# Patient Record
Sex: MALE | Race: BLACK OR AFRICAN AMERICAN | NOT HISPANIC OR LATINO | Employment: OTHER | URBAN - METROPOLITAN AREA
[De-identification: names, ages, dates, MRNs, and addresses within clinical notes are randomized per-mention and may not be internally consistent; named-entity substitution may affect disease eponyms.]

---

## 2020-08-18 ENCOUNTER — OFFICE VISIT (OUTPATIENT)
Dept: INTERNAL MEDICINE | Facility: CLINIC | Age: 85
End: 2020-08-18
Payer: MEDICARE

## 2020-08-18 VITALS
OXYGEN SATURATION: 98 % | WEIGHT: 129 LBS | HEIGHT: 60 IN | DIASTOLIC BLOOD PRESSURE: 68 MMHG | SYSTOLIC BLOOD PRESSURE: 132 MMHG | RESPIRATION RATE: 16 BRPM | TEMPERATURE: 99 F | HEART RATE: 70 BPM | BODY MASS INDEX: 25.32 KG/M2

## 2020-08-18 DIAGNOSIS — R41.3 MEMORY PROBLEM: ICD-10-CM

## 2020-08-18 DIAGNOSIS — D53.9 MACROCYTIC ANEMIA: ICD-10-CM

## 2020-08-18 DIAGNOSIS — Z13.6 ENCOUNTER FOR SCREENING FOR CARDIOVASCULAR DISORDERS: Primary | ICD-10-CM

## 2020-08-18 LAB
ABS NRBC COUNT: 0 X 10 3/UL (ref 0–0.01)
ABSOLUTE BASOPHIL: 0.04 X 10 3/UL (ref 0–0.22)
ABSOLUTE EOSINOPHIL: 0.03 X 10 3/UL (ref 0.04–0.54)
ABSOLUTE IMMATURE GRAN: 0.03 X 10 3/UL (ref 0–0.04)
ABSOLUTE LYMPHOCYTE: 2 X 10 3/UL (ref 0.86–4.75)
ABSOLUTE MONOCYTE: 0.7 X 10 3/UL (ref 0.22–1.08)
ALBUMIN SERPL-MCNC: 4.2 G/DL (ref 3.5–5.2)
ALBUMIN/GLOB SERPL ELPH: 1.3 {RATIO} (ref 1–2.7)
ALP ISOS SERPL LEV INH-CCNC: 56 U/L (ref 40–130)
ALT (SGPT): 19 U/L (ref 0–41)
ANION GAP SERPL CALC-SCNC: 9 MMOL/L (ref 8–17)
AST SERPL-CCNC: 26 U/L (ref 0–40)
BASOPHILS NFR BLD: 0.7 % (ref 0.2–1.2)
BILIRUBIN, TOTAL: 0.4 MG/DL (ref 0–1.2)
BUN/CREAT SERPL: 15.4 (ref 6–20)
CALCIUM SERPL-MCNC: 9.9 MG/DL (ref 8.6–10.2)
CARBON DIOXIDE, CO2: 30 MMOL/L (ref 22–29)
CHLORIDE: 102 MMOL/L (ref 98–107)
CHOLEST SERPL-MSCNC: 180 MG/DL (ref 100–200)
CREAT SERPL-MCNC: 0.69 MG/DL (ref 0.7–1.2)
EOSINOPHIL NFR BLD: 0.6 % (ref 0.7–7)
GFR ESTIMATION: 107.01
GLOBULIN: 3.2 G/DL (ref 1.5–4.5)
GLUCOSE: 101 MG/DL (ref 75–121)
HCT VFR BLD AUTO: 42.4 % (ref 42–52)
HDLC SERPL-MCNC: 49 MG/DL
HGB BLD-MCNC: 13.4 G/DL (ref 14–18)
IMMATURE GRANULOCYTES: 0.6 % (ref 0–0.5)
LDL/HDL RATIO: 2.3 (ref 1–3)
LDLC SERPL CALC-MCNC: 112.2 MG/DL (ref 0–100)
LYMPHOCYTES NFR BLD: 37.4 % (ref 19.3–53.1)
MCH RBC QN AUTO: 31.8 PG (ref 27–32)
MCHC RBC AUTO-ENTMCNC: 31.6 G/DL (ref 32–36)
MCV RBC AUTO: 100.5 FL (ref 80–94)
MONOCYTES NFR BLD: 13.1 % (ref 4.7–12.5)
NEUTROPHILS ABSOLUTE COUNT: 2.55 X 10 3/UL (ref 2.15–7.56)
NEUTROPHILS NFR BLD: 47.6 % (ref 34–71.1)
NUCLEATED RED BLOOD CELLS: 0 /100 WBC (ref 0–0.2)
PLATELET # BLD AUTO: 190 X 10 3/UL (ref 135–400)
POTASSIUM: 4.6 MMOL/L (ref 3.5–5.1)
PROT SNV-MCNC: 7.4 G/DL (ref 6.4–8.3)
RBC # BLD AUTO: 4.22 X 10 6/UL (ref 4.7–6.1)
RDW-SD: 51.3 FL (ref 37–54)
SODIUM: 141 MMOL/L (ref 136–145)
TRIGL SERPL-MCNC: 94 MG/DL (ref 0–150)
TSH SERPL DL<=0.005 MIU/L-ACNC: 3.03 UIU/ML (ref 0.27–4.2)
UREA NITROGEN (BUN): 10.6 MG/DL (ref 7–22)
WBC # BLD: 5.35 X 10 3/UL (ref 4.3–10.8)

## 2020-08-18 PROCEDURE — G0439 PR MEDICARE ANNUAL WELLNESS SUBSEQUENT VISIT: ICD-10-PCS | Mod: S$GLB,,, | Performed by: INTERNAL MEDICINE

## 2020-08-18 PROCEDURE — G0439 PPPS, SUBSEQ VISIT: HCPCS | Mod: S$GLB,,, | Performed by: INTERNAL MEDICINE

## 2020-08-18 PROCEDURE — 1159F PR MEDICATION LIST DOCUMENTED IN MEDICAL RECORD: ICD-10-PCS | Mod: S$GLB,,, | Performed by: INTERNAL MEDICINE

## 2020-08-18 PROCEDURE — 1159F MED LIST DOCD IN RCRD: CPT | Mod: S$GLB,,, | Performed by: INTERNAL MEDICINE

## 2020-08-18 RX ORDER — TIMOLOL MALEATE 5 MG/ML
1 SOLUTION/ DROPS OPHTHALMIC 2 TIMES DAILY
COMMUNITY
Start: 2020-06-30

## 2020-08-18 RX ORDER — LATANOPROST 50 UG/ML
1 SOLUTION/ DROPS OPHTHALMIC 2 TIMES DAILY
COMMUNITY
Start: 2020-06-30

## 2020-08-18 NOTE — PROGRESS NOTES
Subjective:      Patient ID: Ernie Mcmullen is a 89 y.o. male.    Chief Complaint: Medicare AWV    HPI:  Patient no significant past medical history presented today for and will wellness exam.  Patient reports memory is good and he is able to drive and do ADLs without any problem.    Review of Systems   Constitutional: Negative for chills, diaphoresis, fever, malaise/fatigue and weight loss.   HENT: Negative for congestion, ear pain, sinus pain, sore throat and tinnitus.    Eyes: Negative for blurred vision and photophobia.   Respiratory: Negative for cough, hemoptysis, shortness of breath and wheezing.    Cardiovascular: Negative for chest pain, palpitations, orthopnea, leg swelling and PND.   Gastrointestinal: Negative for abdominal pain, blood in stool, constipation, diarrhea, heartburn, melena, nausea and vomiting.   Genitourinary: Negative for dysuria, frequency and urgency.   Musculoskeletal: Negative for back pain, myalgias and neck pain.   Skin: Negative for rash.   Neurological: Negative for dizziness, tremors, seizures, loss of consciousness and weakness.   Endo/Heme/Allergies: Negative for polydipsia.   Psychiatric/Behavioral: Negative for depression and hallucinations. The patient does not have insomnia.      Objective:     Physical Exam  Constitutional:       General: He is not in acute distress.     Appearance: He is not diaphoretic.   Neck:      Thyroid: No thyromegaly.   Cardiovascular:      Rate and Rhythm: Normal rate and regular rhythm.      Heart sounds: Normal heart sounds. No murmur.   Pulmonary:      Effort: Pulmonary effort is normal. No respiratory distress.      Breath sounds: Normal breath sounds. No wheezing.   Abdominal:      General: Bowel sounds are normal. There is no distension.      Palpations: Abdomen is soft.      Tenderness: There is no abdominal tenderness.   Lymphadenopathy:      Cervical: No cervical adenopathy.   Neurological:      Mental Status: He is alert and oriented to  person, place, and time.   Psychiatric:         Behavior: Behavior normal.         Thought Content: Thought content normal.         Judgment: Judgment normal.       Assessment:       ICD-10-CM ICD-9-CM   1. Encounter for screening for cardiovascular disorders  Z13.6 V81.2   2. Memory problem  R41.3 780.93   3. Macrocytic anemia  D53.9 281.9       Plan:   Will order basic lab work including CBC, CMP, lipid  Son reported patient repeating same things again and again.  But patient insists that that is his habit agreed by his wife as well  Patient remembers all the information needed for ADLs and is managing his finances  Patient refused all vaccination  Patient did not remember any of the words that were  requested to remember  Patient labs suggested macrocytic anemia.  Will check B12 and folate  Patient is recommended to have Aricept declined by patient and the family has the think that his memory is at baseline  Talked to patient misti Klein and he will monitor the symptoms    Medication List with Changes/Refills   Current Medications    LATANOPROST 0.005 % OPHTHALMIC SOLUTION    1 drop 2 (two) times a day. One drop in each eye twice daily    TIMOLOL MALEATE 0.5% (TIMOPTIC) 0.5 % DROP    1 drop 2 (two) times a day. One drop in each eye twice daily

## 2020-08-20 DIAGNOSIS — E53.8 FOLATE DEFICIENCY: Primary | ICD-10-CM

## 2020-08-20 LAB
B12: 963 PG/ML (ref 232–1245)
FOLATE SERPL-MCNC: 5.29 NG/ML (ref 5.6–45.8)

## 2020-08-20 RX ORDER — FOLIC ACID 1 MG/1
1 TABLET ORAL DAILY
Qty: 90 TABLET | Refills: 3 | Status: SHIPPED | OUTPATIENT
Start: 2020-08-20 | End: 2023-10-16

## 2020-12-02 ENCOUNTER — TELEPHONE (OUTPATIENT)
Dept: PRIMARY CARE CLINIC | Facility: CLINIC | Age: 85
End: 2020-12-02

## 2020-12-02 DIAGNOSIS — B35.1 ONYCHOMYCOSIS: Primary | ICD-10-CM

## 2020-12-02 NOTE — TELEPHONE ENCOUNTER
----- Message from Nadine Maya sent at 12/1/2020  2:58 PM CST -----  Type:  Patient Returning Call    Who Calledpt   Who Left Message for Patient:Maida  Does the patient know what this is regarding?:referral   Would the patient rather a call back or a response via [facility]? Callback   Best Call Back Number:[phone]   Additional Information:

## 2020-12-02 NOTE — TELEPHONE ENCOUNTER
Staff called Dr Tahira Horvath (podiatist office) spoke to Susie. she advised pt was seen 10-9-2020 and need a referral for that date.

## 2020-12-02 NOTE — TELEPHONE ENCOUNTER
----- Message from Celina Long sent at 12/1/2020 12:09 PM CST -----  Regarding: patient info needed  Contact: Dr. Horvath's Office  Provider's office is needing a referral faxed over to them [phone] and call back at [phone]

## 2021-11-10 ENCOUNTER — OFFICE VISIT (OUTPATIENT)
Dept: PRIMARY CARE CLINIC | Facility: CLINIC | Age: 86
End: 2021-11-10
Payer: MEDICARE

## 2021-11-10 VITALS
HEIGHT: 60 IN | OXYGEN SATURATION: 98 % | DIASTOLIC BLOOD PRESSURE: 71 MMHG | HEART RATE: 67 BPM | WEIGHT: 136 LBS | TEMPERATURE: 97 F | BODY MASS INDEX: 26.7 KG/M2 | RESPIRATION RATE: 16 BRPM | SYSTOLIC BLOOD PRESSURE: 130 MMHG

## 2021-11-10 DIAGNOSIS — D53.9 MACROCYTIC ANEMIA: ICD-10-CM

## 2021-11-10 DIAGNOSIS — Z13.6 SCREENING FOR ISCHEMIC HEART DISEASE: Primary | ICD-10-CM

## 2021-11-10 PROCEDURE — 1159F MED LIST DOCD IN RCRD: CPT | Mod: CPTII,S$GLB,, | Performed by: INTERNAL MEDICINE

## 2021-11-10 PROCEDURE — 99213 PR OFFICE/OUTPT VISIT, EST, LEVL III, 20-29 MIN: ICD-10-PCS | Mod: S$GLB,,, | Performed by: INTERNAL MEDICINE

## 2021-11-10 PROCEDURE — 1159F PR MEDICATION LIST DOCUMENTED IN MEDICAL RECORD: ICD-10-PCS | Mod: CPTII,S$GLB,, | Performed by: INTERNAL MEDICINE

## 2021-11-10 PROCEDURE — 99213 OFFICE O/P EST LOW 20 MIN: CPT | Mod: S$GLB,,, | Performed by: INTERNAL MEDICINE

## 2021-11-10 PROCEDURE — 1160F PR REVIEW ALL MEDS BY PRESCRIBER/CLIN PHARMACIST DOCUMENTED: ICD-10-PCS | Mod: CPTII,S$GLB,, | Performed by: INTERNAL MEDICINE

## 2021-11-10 PROCEDURE — 1160F RVW MEDS BY RX/DR IN RCRD: CPT | Mod: CPTII,S$GLB,, | Performed by: INTERNAL MEDICINE

## 2021-11-10 RX ORDER — ASPIRIN 81 MG/1
81 TABLET ORAL
COMMUNITY
End: 2022-11-10

## 2021-11-11 LAB
ALBUMIN SERPL-MCNC: 4 G/DL (ref 3.6–5.1)
ALBUMIN/GLOB SERPL: 1.3 (CALC) (ref 1–2.5)
ALP SERPL-CCNC: 46 U/L (ref 35–144)
ALT SERPL-CCNC: 15 U/L (ref 9–46)
AST SERPL-CCNC: 20 U/L (ref 10–35)
BASOPHILS # BLD AUTO: 39 CELLS/UL (ref 0–200)
BASOPHILS NFR BLD AUTO: 0.8 %
BILIRUB SERPL-MCNC: 0.6 MG/DL (ref 0.2–1.2)
BUN SERPL-MCNC: 9 MG/DL (ref 7–25)
BUN/CREAT SERPL: ABNORMAL (CALC) (ref 6–22)
CALCIUM SERPL-MCNC: 9.5 MG/DL (ref 8.6–10.3)
CHLORIDE SERPL-SCNC: 103 MMOL/L (ref 98–110)
CHOLEST SERPL-MCNC: 174 MG/DL
CHOLEST/HDLC SERPL: 4 (CALC)
CO2 SERPL-SCNC: 30 MMOL/L (ref 20–32)
CREAT SERPL-MCNC: 0.74 MG/DL (ref 0.7–1.11)
EOSINOPHIL # BLD AUTO: 29 CELLS/UL (ref 15–500)
EOSINOPHIL NFR BLD AUTO: 0.6 %
ERYTHROCYTE [DISTWIDTH] IN BLOOD BY AUTOMATED COUNT: 12.5 % (ref 11–15)
GLOBULIN SER CALC-MCNC: 3.2 G/DL (CALC) (ref 1.9–3.7)
GLUCOSE SERPL-MCNC: 102 MG/DL (ref 65–99)
HCT VFR BLD AUTO: 39.1 % (ref 38.5–50)
HDLC SERPL-MCNC: 44 MG/DL
HGB BLD-MCNC: 13.1 G/DL (ref 13.2–17.1)
LDLC SERPL CALC-MCNC: 113 MG/DL (CALC)
LYMPHOCYTES # BLD AUTO: 1632 CELLS/UL (ref 850–3900)
LYMPHOCYTES NFR BLD AUTO: 33.3 %
MCH RBC QN AUTO: 32.5 PG (ref 27–33)
MCHC RBC AUTO-ENTMCNC: 33.5 G/DL (ref 32–36)
MCV RBC AUTO: 97 FL (ref 80–100)
MONOCYTES # BLD AUTO: 554 CELLS/UL (ref 200–950)
MONOCYTES NFR BLD AUTO: 11.3 %
NEUTROPHILS # BLD AUTO: 2646 CELLS/UL (ref 1500–7800)
NEUTROPHILS NFR BLD AUTO: 54 %
NONHDLC SERPL-MCNC: 130 MG/DL (CALC)
PLATELET # BLD AUTO: 189 THOUSAND/UL (ref 140–400)
PMV BLD REES-ECKER: 10.3 FL (ref 7.5–12.5)
POTASSIUM SERPL-SCNC: 4 MMOL/L (ref 3.5–5.3)
PROT SERPL-MCNC: 7.2 G/DL (ref 6.1–8.1)
RBC # BLD AUTO: 4.03 MILLION/UL (ref 4.2–5.8)
SODIUM SERPL-SCNC: 140 MMOL/L (ref 135–146)
TRIGL SERPL-MCNC: 80 MG/DL
TSH SERPL-ACNC: 1.98 MIU/L (ref 0.4–4.5)
WBC # BLD AUTO: 4.9 THOUSAND/UL (ref 3.8–10.8)

## 2021-11-12 ENCOUNTER — IMMUNIZATION (OUTPATIENT)
Dept: HEMATOLOGY/ONCOLOGY | Facility: CLINIC | Age: 86
End: 2021-11-12
Payer: MEDICARE

## 2021-11-12 DIAGNOSIS — Z23 NEED FOR VACCINATION: Primary | ICD-10-CM

## 2021-11-12 PROCEDURE — 0004A COVID-19, MRNA, LNP-S, PF, 30 MCG/0.3 ML DOSE VACCINE: ICD-10-PCS | Mod: CV19,S$GLB,, | Performed by: FAMILY MEDICINE

## 2021-11-12 PROCEDURE — 91300 COVID-19, MRNA, LNP-S, PF, 30 MCG/0.3 ML DOSE VACCINE: CPT | Mod: S$GLB,,, | Performed by: FAMILY MEDICINE

## 2021-11-12 PROCEDURE — 0004A COVID-19, MRNA, LNP-S, PF, 30 MCG/0.3 ML DOSE VACCINE: CPT | Mod: CV19,S$GLB,, | Performed by: FAMILY MEDICINE

## 2021-11-12 PROCEDURE — 91300 COVID-19, MRNA, LNP-S, PF, 30 MCG/0.3 ML DOSE VACCINE: ICD-10-PCS | Mod: S$GLB,,, | Performed by: FAMILY MEDICINE

## 2022-01-07 ENCOUNTER — TELEPHONE (OUTPATIENT)
Dept: PRIMARY CARE CLINIC | Facility: CLINIC | Age: 87
End: 2022-01-07
Payer: MEDICARE

## 2022-01-07 NOTE — TELEPHONE ENCOUNTER
S/w Patient's son Ernie and notified that he will need to contact the insurance for an approved podiatrist in network.

## 2022-01-07 NOTE — TELEPHONE ENCOUNTER
----- Message from Elvia Solomon sent at 1/5/2022 10:53 AM CST -----  Regarding: podiatry  Contact: Ernie (son)  Patient is on [employer] Medicare and no one in [place] will accept that insurance for podiatry.  Please let patient's son know that they will have to call around and see who will accept it.  It may say on their website that they are accepting it, but that's out of date, so he will need to call the offices and speak to someone before we send a referral.    ----- Message -----  From: Kelly Leary  Sent: 1/5/2022   9:31 AM CST  To: [facility] Staff    Type:  Patient Requesting Referral    Who Called: Ernie Mcmullen (son) re: his father Ernie Mcmullen   Does the patient already have the specialty appointment scheduled?: NO  If yes, what is the date of that appointment?:NO  Referral to What Specialty: PODIATRY  Reason for Referral: TO KEEP HIS TOENAILS CLIPPED  Does the patient want the referral with a specific physician?: N/A  Is the [facility]?: N/A  Patient Requesting a Response?: YES  Would the patient rather a call back or a [facility]?  CALL BACK   Best Call Back Number: [phone]   Additional Information: N/A

## 2022-02-09 ENCOUNTER — TELEPHONE (OUTPATIENT)
Dept: PRIMARY CARE CLINIC | Facility: CLINIC | Age: 87
End: 2022-02-09
Payer: MEDICARE

## 2022-02-09 NOTE — TELEPHONE ENCOUNTER
----- Message from Bonnie Montiel sent at 2/9/2022 11:02 AM CST -----  Contact: Pt son  .Type:  Patient Requesting Referral    Who Called: Ernie   Does the patient already have the specialty appointment scheduled?:   If yes, what is the date of that appointment?:   Referral to What Specialty: podiatrist   Reason for Referral: toe nail cut    Does the patient want the referral with a specific physician?:   Is the specialist an [facility]?:   Patient Requesting a Response?: yes  Would the patient rather a call back or a response via [facility]? Callback   Best Call Back Number:.[phone]    Additional Information:

## 2022-02-09 NOTE — TELEPHONE ENCOUNTER
S/w patient's son- Ernie and he was confused, stating he never heard of this. Advised to please contact his previous podiatrist and see what he was originally referred for or request the records so we can review them. He will call them and let us know.

## 2022-02-11 ENCOUNTER — TELEPHONE (OUTPATIENT)
Dept: PRIMARY CARE CLINIC | Facility: CLINIC | Age: 87
End: 2022-02-11
Payer: MEDICARE

## 2022-02-11 DIAGNOSIS — B35.1 ONYCHOMYCOSIS: Primary | ICD-10-CM

## 2022-02-11 NOTE — TELEPHONE ENCOUNTER
----- Message from Inés Belcher LPN sent at 2/10/2022  4:54 PM CST -----  Contact: Ernie (Son)    ----- Message -----  From: Kelly Leary  Sent: 2/10/2022  10:49 AM CST  To: [facility] Staff    Type:  Patient Returning Call    Who Called: Ernie (Son)   Who Left Message for Patient: Jazmyn  Does the patient know what this is regarding?: Code for podiatrist.  Would the patient rather a call back or a response via [facility]?   Call back   Best Call Back Number: [phone]   Additional Information: n/a

## 2022-10-13 ENCOUNTER — CLINICAL SUPPORT (OUTPATIENT)
Dept: PRIMARY CARE CLINIC | Facility: CLINIC | Age: 87
End: 2022-10-13
Payer: MEDICARE

## 2022-10-13 PROCEDURE — 90694 VACC AIIV4 NO PRSRV 0.5ML IM: CPT | Mod: S$GLB,,, | Performed by: INTERNAL MEDICINE

## 2022-10-13 PROCEDURE — G0008 FLU VACCINE - QUADRIVALENT - ADJUVANTED: ICD-10-PCS | Mod: S$GLB,,, | Performed by: INTERNAL MEDICINE

## 2022-10-13 PROCEDURE — 90694 FLU VACCINE - QUADRIVALENT - ADJUVANTED: ICD-10-PCS | Mod: S$GLB,,, | Performed by: INTERNAL MEDICINE

## 2022-10-13 PROCEDURE — G0008 ADMIN INFLUENZA VIRUS VAC: HCPCS | Mod: S$GLB,,, | Performed by: INTERNAL MEDICINE

## 2022-11-10 ENCOUNTER — OFFICE VISIT (OUTPATIENT)
Dept: PRIMARY CARE CLINIC | Facility: CLINIC | Age: 87
End: 2022-11-10
Payer: MEDICARE

## 2022-11-10 VITALS
HEART RATE: 62 BPM | WEIGHT: 124.63 LBS | SYSTOLIC BLOOD PRESSURE: 124 MMHG | TEMPERATURE: 98 F | HEIGHT: 60 IN | BODY MASS INDEX: 24.47 KG/M2 | DIASTOLIC BLOOD PRESSURE: 69 MMHG | RESPIRATION RATE: 16 BRPM | OXYGEN SATURATION: 99 %

## 2022-11-10 DIAGNOSIS — Z00.00 ENCOUNTER FOR MEDICARE ANNUAL WELLNESS EXAM: ICD-10-CM

## 2022-11-10 DIAGNOSIS — H91.93 DECREASED HEARING OF BOTH EARS: ICD-10-CM

## 2022-11-10 DIAGNOSIS — Z13.6 SCREENING FOR ISCHEMIC HEART DISEASE: Primary | ICD-10-CM

## 2022-11-10 DIAGNOSIS — D64.9 ANEMIA, UNSPECIFIED TYPE: ICD-10-CM

## 2022-11-10 DIAGNOSIS — E53.8 FOLATE DEFICIENCY: ICD-10-CM

## 2022-11-10 PROCEDURE — G0439 PPPS, SUBSEQ VISIT: HCPCS | Mod: S$GLB,,, | Performed by: INTERNAL MEDICINE

## 2022-11-10 PROCEDURE — 1160F PR REVIEW ALL MEDS BY PRESCRIBER/CLIN PHARMACIST DOCUMENTED: ICD-10-PCS | Mod: CPTII,S$GLB,, | Performed by: INTERNAL MEDICINE

## 2022-11-10 PROCEDURE — 1159F PR MEDICATION LIST DOCUMENTED IN MEDICAL RECORD: ICD-10-PCS | Mod: CPTII,S$GLB,, | Performed by: INTERNAL MEDICINE

## 2022-11-10 PROCEDURE — 1160F RVW MEDS BY RX/DR IN RCRD: CPT | Mod: CPTII,S$GLB,, | Performed by: INTERNAL MEDICINE

## 2022-11-10 PROCEDURE — G0439 PR MEDICARE ANNUAL WELLNESS SUBSEQUENT VISIT: ICD-10-PCS | Mod: S$GLB,,, | Performed by: INTERNAL MEDICINE

## 2022-11-10 PROCEDURE — 1159F MED LIST DOCD IN RCRD: CPT | Mod: CPTII,S$GLB,, | Performed by: INTERNAL MEDICINE

## 2022-11-10 NOTE — PROGRESS NOTES
Subjective:      Patient ID: Ernie Mcmullen is a 89 y.o. male.    Chief Complaint: Medicare AWV    HPI:  Patient no significant past medical history presented today for and will wellness exam.  Patient reports memory is good and he is able to drive and do ADLs without any problem.  Patient is not taking any medications     Review of Systems   Constitutional: Negative for chills, diaphoresis, fever, malaise/fatigue and weight loss.   HENT: Negative for congestion, ear pain, sinus pain, sore throat and tinnitus.    Eyes: Negative for blurred vision and photophobia.   Respiratory: Negative for cough, hemoptysis, shortness of breath and wheezing.    Cardiovascular: Negative for chest pain, palpitations, orthopnea, leg swelling and PND.   Gastrointestinal: Negative for abdominal pain, blood in stool, constipation, diarrhea, heartburn, melena, nausea and vomiting.   Genitourinary: Negative for dysuria, frequency and urgency.   Musculoskeletal: Negative for back pain, myalgias and neck pain.   Skin: Negative for rash.   Neurological: Negative for dizziness, tremors, seizures, loss of consciousness and weakness.   Endo/Heme/Allergies: Negative for polydipsia.   Psychiatric/Behavioral: Negative for depression and hallucinations. The patient does not have insomnia.      Objective:     Physical Exam  Constitutional:       General: He is not in acute distress.     Appearance: He is not diaphoretic.   Neck:      Thyroid: No thyromegaly.   Cardiovascular:      Rate and Rhythm: Normal rate and regular rhythm.      Heart sounds: Normal heart sounds. No murmur.   Pulmonary:      Effort: Pulmonary effort is normal. No respiratory distress.      Breath sounds: Normal breath sounds. No wheezing.   Abdominal:      General: Bowel sounds are normal. There is no distension.      Palpations: Abdomen is soft.      Tenderness: There is no abdominal tenderness.   Lymphadenopathy:      Cervical: No cervical adenopathy.   Neurological:       Mental Status: He is alert and oriented to person, place, and time.   Psychiatric:         Behavior: Behavior normal.         Thought Content: Thought content normal.         Judgment: Judgment normal.       Assessment:       ICD-10-CM ICD-9-CM   1. Screening for ischemic heart disease  Z13.6 V81.0   2. Encounter for Medicare annual wellness exam  Z00.00 V70.0   3. Anemia, unspecified type  D64.9 285.9   4. Decreased hearing of both ears  H91.93 389.9   5. Folate deficiency  E53.8 266.2         Plan:     Pt is here for wellness exam.  Will order Annual labs  Patient has previous history of folate deficiency.  Will check folate and B12 as well  Patient last labs suggested anemia.  Will repeat CBC and thyroid function  Patient reports decreased hearing Will refer to audiologist for further evaluation  Patient does not have any h/o coronary artery disease or CVA.  Will stop aspirin      Medication List with Changes/Refills   Current Medications    FOLIC ACID (FOLVITE) 1 MG TABLET    Take 1 tablet (1 mg total) by mouth once daily.    LATANOPROST 0.005 % OPHTHALMIC SOLUTION    1 drop 2 (two) times a day. One drop in each eye twice daily    TIMOLOL MALEATE 0.5% (TIMOPTIC) 0.5 % DROP    1 drop 2 (two) times a day. One drop in each eye twice daily   Discontinued Medications    ASPIRIN (ECOTRIN) 81 MG EC TABLET    Take 81 mg by mouth.          Ernie Mcmullen presented for a  Medicare AWV and comprehensive Health Risk Assessment today. The following components were reviewed and updated:      Health Risk Assessment  During the past four weeks, was someone available to help if you needed and wanted help?: Yes, as much as I wanted  Can you go shopping for groceries or clothes without someone's help?: Yes  Can you prepare your own meals?: Yes  Can you do your own housework without help?: Yes  Are you a smoker?: No   Patient COVID vaccine is up-to-date,  Patient has received influenza vaccine.  Recommended patient to get  pneumococcal 20, Tdap, shingles vaccine..  Declined by daughter at this time    Health Maintenance Topics with due status: Not Due       Topic Last Completion Date    Lipid Panel 11/10/2021      Patient Care Team:  Fide Zimmer MD as PCP - General (Internal Medicine)          ** See Completed Assessments for Annual Wellness Visit within the encounter summary.**       The following assessments were completed:    Depression Screening  Depression Patient Health Questionnaire (PHQ-2)  Over the last two weeks how often have you been bothered by little interest or pleasure in doing things: Not at all  Over the last two weeks how often have you been bothered by feeling down, depressed or hopeless: Not at all  PHQ-2 Total Score: 0     Whisper Test  Whisper Test: Abnormal ..  Will refer for audiology evaluation    Cognitive Function Screening  Mini-Cog Instant Recall  How many words have been recalled?: 3  Clock Drawing Test  The total for the clock drawing test is either 0 or 2: No  Clock Drawing Score: 0  Mini-Cog 3 minute recall  How many words have been recalled?: 2  Cognitive Score: 2   Nutrition Screening:  Patient has good nutritional status:                                     Body mass index is 31.19 kg/m².                           ADL Screening:  Patient is able to do ADLs without any assistance                             For of patient kids live in [place] and are always willing to help             Patient is not using any narcotics/benzodiazepine or any other controlled medication    Diagnoses and health risks identified today and associated recommendations/orders:        Provided Ernie with a 5-10 year written screening schedule and personal prevention plan. Recommendations were developed using the USPSTF age appropriate recommendations. Education, counseling, and referrals were provided as needed. After Visit Summary printed and given to patient which includes a list of additional screenings\tests  needed.    I offered to discuss end of life issues, including information on how to make advance directives that the patient could use to name someone who would make medical decisions on their behalf if they became too ill to make themselves.    ___Patient declined - already done.  ___Virtual Visit, not discussed  __xx_Patient is interested, I provided paperwork and offered to discuss..  Patient daughter reports patient has documented living will advanced directives        No follow-ups on file.    Fide Zimmer MD

## 2023-01-23 ENCOUNTER — TELEPHONE (OUTPATIENT)
Dept: PRIMARY CARE CLINIC | Facility: CLINIC | Age: 88
End: 2023-01-23
Payer: MEDICARE

## 2023-01-23 NOTE — TELEPHONE ENCOUNTER
Staff called pt son to have pt come in tomorrow at 9:15am.. he advised father will not come out tomorrow because the weather will be bad.. please schedule next appt w/dr vasques for ear wash

## 2023-01-23 NOTE — TELEPHONE ENCOUNTER
----- Message from Inés Belcher LPN sent at 1/12/2023  4:19 PM CST -----  Contact: Ernie (son)    ----- Message -----  From: Eula Kam  Sent: 1/12/2023   3:46 PM CST  To: [facility] Staff    Ernie called to consult with nurse or staff regarding a call back. He didn't want to state what the call was regarding but would like a call back. He can be reached at [phone]. Thanks/MR

## 2023-10-05 ENCOUNTER — TELEPHONE (OUTPATIENT)
Dept: PRIMARY CARE CLINIC | Facility: CLINIC | Age: 88
End: 2023-10-05
Payer: MEDICARE

## 2023-10-05 ENCOUNTER — NURSE TRIAGE (OUTPATIENT)
Dept: ADMINISTRATIVE | Facility: CLINIC | Age: 88
End: 2023-10-05
Payer: MEDICARE

## 2023-10-05 DIAGNOSIS — U07.1 COVID-19 VIRUS INFECTION: Primary | ICD-10-CM

## 2023-10-05 NOTE — TELEPHONE ENCOUNTER
Son Ernie , states ongoing cough for 1 wk.   EC, Shannon, placed on line to assist with questions.   Cough, ongoing for a wk.   Dx today with COVID home test today.   Current temp 97.7 via infrared.   Pt has not produced urine all day.   Care advice provided per protocol, with recommendation to go to ED at this time. Caregiver VU.   Reason for Disposition   [1] Drinking very little AND [2] dehydration suspected (e.g., no urine > 12 hours, very dry mouth, very lightheaded)    Additional Information   Negative: SEVERE difficulty breathing (e.g., struggling for each breath, speaks in single words)   Negative: Difficult to awaken or acting confused (e.g., disoriented, slurred speech)   Negative: Bluish (or gray) lips or face now   Negative: Shock suspected (e.g., cold/pale/clammy skin, too weak to stand, low BP, rapid pulse)   Negative: Sounds like a life-threatening emergency to the triager   Negative: SEVERE or constant chest pain or pressure  (Exception: Mild central chest pain, present only when coughing.)   Negative: MODERATE difficulty breathing (e.g., speaks in phrases, SOB even at rest, pulse 100-120)   Negative: [1] Headache AND [2] stiff neck (can't touch chin to chest)   Negative: Oxygen level (e.g., pulse oximetry) 90 percent or lower   Negative: Chest pain or pressure  (Exception: MILD central chest pain, present only when coughing.)    Protocols used: Coronavirus (COVID-19) Diagnosed or [id number]

## 2023-10-05 NOTE — TELEPHONE ENCOUNTER
----- Message from Inés Belcher LPN sent at 10/5/2023  3:40 PM CDT -----  Contact: Ernie    ----- Message -----  From: David Rosales  Sent: 10/5/2023   3:12 PM CDT  To: [facility] Staff    Patients son is calling to schedule an appt for patient to get tested for Covid. Patients son reports testing positive for Covid today and just wanted to make sure parents were good. Please give patients son a call at [phone]

## 2023-10-05 NOTE — TELEPHONE ENCOUNTER
Returned call to patient's son, Ernie and he states he (son) tested positive for COVID19 today and he has been around his parents for the last week. Currently he is isolated but his father developed cough >3 days prior. He requested his parents be tested for COVID19. Please advise.

## 2023-10-06 RX ORDER — BENZONATATE 100 MG/1
100 CAPSULE ORAL 3 TIMES DAILY PRN
Qty: 30 CAPSULE | Refills: 0 | Status: SHIPPED | OUTPATIENT
Start: 2023-10-06 | End: 2023-10-16

## 2023-10-06 NOTE — TELEPHONE ENCOUNTER
Pt sons states patient tested positive for Covid. Pt was seen in the ER. Son is unsure of what ER pt was seen at. Pt has a persistent cough, but is overall doing well. Pt is requesting a Rx be called out to help with his cough. Pt has been isolating. Please advise.

## 2023-10-06 NOTE — TELEPHONE ENCOUNTER
Talked to patient's son and he reported patient has cough only no fever chills shortness of breath  Will use Tessalon Perles for cough

## 2023-10-09 ENCOUNTER — TELEPHONE (OUTPATIENT)
Dept: PRIMARY CARE CLINIC | Facility: CLINIC | Age: 88
End: 2023-10-09
Payer: MEDICARE

## 2023-10-09 NOTE — TELEPHONE ENCOUNTER
Spoke to pt' son and he states patient is staying hydrated, exercising regularly, no problems going to the restroom. Pt's son states he has a slight rattle on his chest but no fever, no aches, and the coughing has stopped. Pt's son has been advised to keep his daily regimen going, it is helping the patient.

## 2023-10-09 NOTE — TELEPHONE ENCOUNTER
----- Message from Beatriz Bain sent at 10/9/2023  8:12 AM CDT -----  Contact: ernie/son  Ernie stated that patient still has a little rattling in his chest. Please call him back at [phone] to discuss further.         Thanks  DD

## 2023-10-10 ENCOUNTER — NURSE TRIAGE (OUTPATIENT)
Dept: ADMINISTRATIVE | Facility: CLINIC | Age: 88
End: 2023-10-10
Payer: MEDICARE

## 2023-10-10 ENCOUNTER — TELEPHONE (OUTPATIENT)
Dept: PRIMARY CARE CLINIC | Facility: CLINIC | Age: 88
End: 2023-10-10
Payer: MEDICARE

## 2023-10-10 NOTE — TELEPHONE ENCOUNTER
Patient's son has called to advise of pt's current ED visit. Pt has not been seen by a provider at the ED as of now, no recommendations or reports were given due to pt awaiting to be seen. Please advise :      Transferred from scheduling. Son calling on behalf of pt. States trying to get in touch with PCP office when he was transferred. States today at podiatrist office 104/40 and advised by podiatrist  to go to an  ED for eval. Son states he left the first ED they went to because it was busy. States he is currently at a second ED and waiting to check in. Pt son advised that as a provider directed them to be evaluated in the ED, they should follow that dispo and stay for pt to be evaluated. Son states they will stay and do ED visit. Asking that provider be made aware and asking for follow up call from provider when able. Pt was not triaged for symptoms as they are at an ED awaiting eval at present and had already been directed to go to ED by a provider. Information only. Advised NT will route to PCP asking for follow up call.   Reason for Disposition   Nursing judgment    Additional Information   Negative: Nursing judgment   Negative: Nursing judgment    Protocols used: Information Only Call - No Triage-[facility]

## 2023-10-10 NOTE — TELEPHONE ENCOUNTER
----- Message from Shannon Brown sent at 10/10/2023 11:37 AM CDT -----  Contact: self  Pt is calling about low blood pressure pls call [phone] pt is on his way to @[facility]

## 2023-10-10 NOTE — TELEPHONE ENCOUNTER
Transferred from scheduling. Son calling on behalf of pt. States trying to get in touch with PCP office when he was transferred. States today at podiatrist office 104/40 and advised by podiatrist  to go to an  ED for eval. Son states he left the first ED they went to because it was busy. States he is currently at a second ED and waiting to check in. Pt son advised that as a provider directed them to be evaluated in the ED, they should follow that dispo and stay for pt to be evaluated. Son states they will stay and do ED visit. Asking that provider be made aware and asking for follow up call from provider when able. Pt was not triaged for symptoms as they are at an ED awaiting eval at present and had already been directed to go to ED by a provider. Information only. Advised NT will route to PCP asking for follow up call.   Reason for Disposition   Nursing judgment    Additional Information   Negative: Nursing judgment   Negative: Nursing judgment    Protocols used: Information Only Call - No Triage-[facility]

## 2023-10-11 ENCOUNTER — TELEPHONE (OUTPATIENT)
Dept: PRIMARY CARE CLINIC | Facility: CLINIC | Age: 88
End: 2023-10-11
Payer: MEDICARE

## 2023-10-11 NOTE — TELEPHONE ENCOUNTER
----- Message from Eula Kam sent at 10/11/2023  8:25 AM CDT -----  Contact: Ernie(son)  Type:  Sooner Apoointment Request    Caller is requesting a sooner appointment.  Caller declined first available appointment listed below.  Caller will not accept being placed on the waitlist and is requesting a message be sent to doctor.  Name of Caller:Ernie   When is the first available appointment?11/2  Symptoms:hospital follow up  Would the patient rather a call back or a response via [facility]? Call back   Best Call Back Number:[phone]  Additional Information:

## 2023-10-11 NOTE — TELEPHONE ENCOUNTER
Advised son, pt's father has an upcoming visit where he can discuss all his dad's needs. Son gave a verbalized understanding.

## 2023-10-11 NOTE — TELEPHONE ENCOUNTER
Spoke with son, he states patient was seen last night in ER for low BP. Son advised patient will be scheduled for a hospital follow up. He gave a verbalized understanding.

## 2023-10-16 ENCOUNTER — OFFICE VISIT (OUTPATIENT)
Dept: PRIMARY CARE CLINIC | Facility: CLINIC | Age: 88
End: 2023-10-16
Payer: MEDICARE

## 2023-10-16 VITALS
SYSTOLIC BLOOD PRESSURE: 137 MMHG | HEIGHT: 60 IN | HEART RATE: 72 BPM | OXYGEN SATURATION: 96 % | TEMPERATURE: 97 F | DIASTOLIC BLOOD PRESSURE: 68 MMHG | BODY MASS INDEX: 23.49 KG/M2 | WEIGHT: 119.63 LBS | RESPIRATION RATE: 16 BRPM

## 2023-10-16 DIAGNOSIS — U09.9 POST-COVID CHRONIC COUGH: Primary | ICD-10-CM

## 2023-10-16 DIAGNOSIS — R05.3 POST-COVID CHRONIC COUGH: Primary | ICD-10-CM

## 2023-10-16 PROCEDURE — 1160F RVW MEDS BY RX/DR IN RCRD: CPT | Mod: CPTII,S$GLB,, | Performed by: INTERNAL MEDICINE

## 2023-10-16 PROCEDURE — 1160F PR REVIEW ALL MEDS BY PRESCRIBER/CLIN PHARMACIST DOCUMENTED: ICD-10-PCS | Mod: CPTII,S$GLB,, | Performed by: INTERNAL MEDICINE

## 2023-10-16 PROCEDURE — 99213 OFFICE O/P EST LOW 20 MIN: CPT | Mod: S$GLB,,, | Performed by: INTERNAL MEDICINE

## 2023-10-16 PROCEDURE — 1159F MED LIST DOCD IN RCRD: CPT | Mod: CPTII,S$GLB,, | Performed by: INTERNAL MEDICINE

## 2023-10-16 PROCEDURE — 1159F PR MEDICATION LIST DOCUMENTED IN MEDICAL RECORD: ICD-10-PCS | Mod: CPTII,S$GLB,, | Performed by: INTERNAL MEDICINE

## 2023-10-16 PROCEDURE — 99213 PR OFFICE/OUTPT VISIT, EST, LEVL III, 20-29 MIN: ICD-10-PCS | Mod: S$GLB,,, | Performed by: INTERNAL MEDICINE

## 2023-10-16 RX ORDER — GUAIFENESIN 600 MG/1
1200 TABLET, EXTENDED RELEASE ORAL 2 TIMES DAILY
COMMUNITY

## 2023-10-16 NOTE — PROGRESS NOTES
"  Subjective:      Patient ID: Ernie Mcmullen is a 89 y.o. male.    Chief Complaint: Diabetes (F/u post covid19, c/o fatigue, hypotension), Cough (C/o cough, mainly at night, taking otc mucinex), and Follow-up (F/u discuss need for skilled nursing)    Patient with no known medical problem was tested positive for COVID 10 days ago.  Patient was having cough, fever, low blood pressure, fatigue.  Patient went to ER where he was treated with Tessalon Perles for cough.  Patient was isolated for 5 days.  Patient reports no more symptoms.  Patient denies any cough wheezing shortness of breath fever or chill    Review of Systems   Constitutional:  Negative for chills, diaphoresis, fever, malaise/fatigue and weight loss.   HENT:  Negative for congestion, ear pain, sinus pain, sore throat and tinnitus.    Eyes:  Negative for blurred vision and photophobia.   Respiratory:  Positive for cough. Negative for hemoptysis, shortness of breath and wheezing.    Cardiovascular:  Negative for chest pain, palpitations, orthopnea, leg swelling and PND.   Gastrointestinal:  Negative for abdominal pain, blood in stool, constipation, diarrhea, heartburn, melena, nausea and vomiting.   Genitourinary:  Negative for dysuria, frequency and urgency.   Musculoskeletal:  Negative for back pain, myalgias and neck pain.   Skin:  Negative for rash.   Neurological:  Negative for dizziness, tremors, seizures, loss of consciousness and weakness.   Endo/Heme/Allergies:  Negative for polydipsia.   Psychiatric/Behavioral:  Negative for depression and hallucinations. The patient does not have insomnia.      Objective:   /68 (BP Location: Left arm, Patient Position: Sitting, BP Method: Medium (Automatic))   Pulse 72   Temp 97 °F (36.1 °C) (Temporal)   Resp 16   Ht 4' 5" (1.346 m)   Wt 54.3 kg (119 lb 9.6 oz)   SpO2 96%   BMI 29.94 kg/m²     Physical Exam  Constitutional:       General: He is not in acute distress.     Appearance: He is not " diaphoretic.   Neck:      Thyroid: No thyromegaly.   Cardiovascular:      Rate and Rhythm: Normal rate and regular rhythm.      Heart sounds: Normal heart sounds. No murmur heard.  Pulmonary:      Effort: Pulmonary effort is normal. No respiratory distress.      Breath sounds: Normal breath sounds. No wheezing.   Abdominal:      General: Bowel sounds are normal. There is no distension.      Palpations: Abdomen is soft.      Tenderness: There is no abdominal tenderness.   Lymphadenopathy:      Cervical: No cervical adenopathy.   Neurological:      Mental Status: He is alert and oriented to person, place, and time.   Psychiatric:         Behavior: Behavior normal.         Thought Content: Thought content normal.         Judgment: Judgment normal.     :   Assessment:       ICD-10-CM ICD-9-CM   1. Post-COVID chronic cough  R05.3 786.2    U09.9 139.8       Plan:     Patient had COVID and has recovered well  Patient has mild cough on and off for which she takes guaifenesin and Tessalon Perles  Patient denies any fatigue or other symptoms  Flu, shingle vaccine  Medication List with Changes/Refills   Current Medications    ACETAMINOPHEN (TYLENOL ORAL)    Take 220 mg by mouth every 8 (eight) hours as needed. OTC    BENZONATATE (TESSALON) 100 MG CAPSULE    Take 1 capsule (100 mg total) by mouth 3 (three) times daily as needed for Cough.    GUAIFENESIN (MUCINEX) 600 MG 12 HR TABLET    Take 1,200 mg by mouth 2 (two) times daily. OTC    LATANOPROST 0.005 % OPHTHALMIC SOLUTION    1 drop 2 (two) times a day. One drop in each eye twice daily    TIMOLOL MALEATE 0.5% (TIMOPTIC) 0.5 % DROP    1 drop 2 (two) times a day. One drop in each eye twice daily   Discontinued Medications    FOLIC ACID (FOLVITE) 1 MG TABLET    Take 1 tablet (1 mg total) by mouth once daily.

## 2023-12-04 ENCOUNTER — TELEPHONE (OUTPATIENT)
Dept: PRIMARY CARE CLINIC | Facility: CLINIC | Age: 88
End: 2023-12-04
Payer: MEDICARE

## 2023-12-04 NOTE — TELEPHONE ENCOUNTER
Pt's son advised his MOTHER (a different patient) has been faxed over. Verbalized understanding,.

## 2023-12-05 ENCOUNTER — CLINICAL SUPPORT (OUTPATIENT)
Dept: PRIMARY CARE CLINIC | Facility: CLINIC | Age: 88
End: 2023-12-05
Payer: MEDICARE

## 2023-12-05 DIAGNOSIS — Z23 FLU VACCINE NEED: Primary | ICD-10-CM

## 2023-12-05 PROCEDURE — G0008 FLU VACCINE - QUADRIVALENT - ADJUVANTED: ICD-10-PCS | Mod: S$GLB,,, | Performed by: INTERNAL MEDICINE

## 2023-12-05 PROCEDURE — 90694 FLU VACCINE - QUADRIVALENT - ADJUVANTED: ICD-10-PCS | Mod: S$GLB,,, | Performed by: INTERNAL MEDICINE

## 2023-12-05 PROCEDURE — 90694 VACC AIIV4 NO PRSRV 0.5ML IM: CPT | Mod: S$GLB,,, | Performed by: INTERNAL MEDICINE

## 2023-12-05 PROCEDURE — G0008 ADMIN INFLUENZA VIRUS VAC: HCPCS | Mod: S$GLB,,, | Performed by: INTERNAL MEDICINE

## 2024-04-09 ENCOUNTER — OFFICE VISIT (OUTPATIENT)
Dept: PRIMARY CARE CLINIC | Facility: CLINIC | Age: 89
End: 2024-04-09
Payer: MEDICARE

## 2024-04-09 ENCOUNTER — CLINICAL SUPPORT (OUTPATIENT)
Dept: OBSTETRICS AND GYNECOLOGY | Facility: CLINIC | Age: 89
End: 2024-04-09
Payer: MEDICARE

## 2024-04-09 VITALS
OXYGEN SATURATION: 99 % | HEART RATE: 67 BPM | TEMPERATURE: 98 F | RESPIRATION RATE: 16 BRPM | WEIGHT: 117.63 LBS | BODY MASS INDEX: 23.1 KG/M2 | HEIGHT: 60 IN | SYSTOLIC BLOOD PRESSURE: 117 MMHG | DIASTOLIC BLOOD PRESSURE: 65 MMHG

## 2024-04-09 DIAGNOSIS — Z01.89 ROUTINE LAB DRAW: Primary | ICD-10-CM

## 2024-04-09 DIAGNOSIS — E53.8 FOLATE DEFICIENCY: ICD-10-CM

## 2024-04-09 DIAGNOSIS — D64.9 ANEMIA, UNSPECIFIED TYPE: Primary | ICD-10-CM

## 2024-04-09 LAB
ABS NRBC COUNT: 0 THOU/UL (ref 0–0.01)
ABSOLUTE BASOPHIL: 0 10*3/UL (ref 0–0.3)
ABSOLUTE EOSINOPHIL: 0.1 10*3/UL (ref 0–0.6)
ABSOLUTE IMMATURE GRAN: 0.02 THOU/UL (ref 0–0.03)
ABSOLUTE LYMPHOCYTE: 1.4 10*3/UL (ref 1.2–4)
ABSOLUTE MONOCYTE: 0.6 10*3/UL (ref 0.1–0.8)
ALBUMIN SERPL BCP-MCNC: 3.4 G/DL (ref 3.4–5)
ALP SERPL-CCNC: 62 U/L (ref 45–117)
ALT SERPL W P-5'-P-CCNC: 20 U/L (ref 16–61)
ANION GAP SERPL CALC-SCNC: 1 MMOL/L (ref 3–11)
AST SERPL-CCNC: 18 U/L (ref 15–37)
BASOPHILS NFR BLD: 0.3 % (ref 0–3)
BILIRUB SERPL-MCNC: 0.5 MG/DL (ref 0.2–1)
BUN SERPL-MCNC: 10 MG/DL (ref 7–18)
BUN/CREAT SERPL: 12.82 RATIO
CALCIUM SERPL-MCNC: 9.4 MG/DL (ref 8.5–10.1)
CHLORIDE SERPL-SCNC: 103 MMOL/L (ref 98–107)
CO2 SERPL-SCNC: 32 MMOL/L (ref 21–32)
CREAT SERPL-MCNC: 0.78 MG/DL (ref 0.7–1.3)
EOSINOPHIL NFR BLD: 0.8 % (ref 0–6)
ERYTHROCYTE [DISTWIDTH] IN BLOOD BY AUTOMATED COUNT: 13.5 % (ref 0–15.5)
FOLATE: 33.1 NG/ML (ref 3.1–17.5)
GFR ESTIMATION: > 60
GLUCOSE SERPL-MCNC: 85 MG/DL (ref 74–106)
HCT VFR BLD AUTO: 40.7 % (ref 42–52)
HGB BLD-MCNC: 12.9 G/DL (ref 14–18)
IMMATURE GRANULOCYTES: 0.3 % (ref 0–0.43)
LYMPHOCYTES NFR BLD: 22.9 % (ref 20–45)
MCH RBC QN AUTO: 32.3 PG (ref 27–32)
MCHC RBC AUTO-ENTMCNC: 31.7 % (ref 32–36)
MCV RBC AUTO: 101.8 FL (ref 80–97)
MONOCYTES NFR BLD: 10.6 % (ref 2–10)
NEUTROPHILS # BLD AUTO: 3.9 10*3/UL (ref 1.4–7)
NEUTROPHILS NFR BLD: 65.1 % (ref 50–80)
NUCLEATED RED BLOOD CELLS: 0 % (ref 0–0.2)
PLATELETS: 177 10*3/UL (ref 130–400)
PMV BLD AUTO: 10.5 FL (ref 9.2–12.2)
POTASSIUM SERPL-SCNC: 4.2 MMOL/L (ref 3.5–5.1)
PROT SERPL-MCNC: 7.3 G/DL (ref 6.4–8.2)
RBC # BLD AUTO: 4 10*6/UL (ref 4.7–6.1)
SODIUM BLD-SCNC: 136 MMOL/L (ref 131–143)
TSH SERPL DL<=0.005 MIU/L-ACNC: 2.4 UIU/ML (ref 0.36–3.74)
WBC # BLD: 5.9 10*3/UL (ref 4.5–10)

## 2024-04-09 PROCEDURE — 36415 COLL VENOUS BLD VENIPUNCTURE: CPT | Mod: S$GLB,,, | Performed by: INTERNAL MEDICINE

## 2024-04-09 PROCEDURE — 1160F RVW MEDS BY RX/DR IN RCRD: CPT | Mod: CPTII,S$GLB,, | Performed by: INTERNAL MEDICINE

## 2024-04-09 PROCEDURE — 1159F MED LIST DOCD IN RCRD: CPT | Mod: CPTII,S$GLB,, | Performed by: INTERNAL MEDICINE

## 2024-04-09 PROCEDURE — 99213 OFFICE O/P EST LOW 20 MIN: CPT | Mod: S$GLB,,, | Performed by: INTERNAL MEDICINE

## 2024-04-09 NOTE — PROGRESS NOTES
"  Subjective:      Patient ID: Ernie Mcmullen is a 89 y.o. male.    Chief Complaint: Follow-up (F/u )    Patient with previous history of anemia + Folate deficiency presented for follow-up. Patient denies any complains.  Patient reports he is feeling well.      Review of Systems   Constitutional:  Negative for chills, diaphoresis, fever, malaise/fatigue and weight loss.   HENT:  Negative for congestion, ear pain, sinus pain, sore throat and tinnitus.    Eyes:  Negative for blurred vision and photophobia.   Respiratory:  Negative for cough, hemoptysis, shortness of breath and wheezing.    Cardiovascular:  Negative for chest pain, palpitations, orthopnea, leg swelling and PND.   Gastrointestinal:  Negative for abdominal pain, blood in stool, constipation, diarrhea, heartburn, melena, nausea and vomiting.   Genitourinary:  Negative for dysuria, frequency and urgency.   Musculoskeletal:  Negative for back pain, myalgias and neck pain.   Skin:  Negative for rash.   Neurological:  Negative for dizziness, tremors, seizures, loss of consciousness and weakness.   Endo/Heme/Allergies:  Negative for polydipsia.   Psychiatric/Behavioral:  Negative for depression and hallucinations. The patient does not have insomnia.      Objective:   /65 (BP Location: Left arm, Patient Position: Sitting, BP Method: Medium (Automatic))   Pulse 67   Temp 97.8 °F (36.6 °C) (Oral)   Resp 16   Ht 4' 5" (1.346 m)   Wt 53.3 kg (117 lb 9.6 oz)   SpO2 99%   BMI 29.43 kg/m²     Physical Exam  Constitutional:       General: He is not in acute distress.     Appearance: He is not diaphoretic.   Neck:      Thyroid: No thyromegaly.   Cardiovascular:      Rate and Rhythm: Normal rate and regular rhythm.      Heart sounds: Normal heart sounds. No murmur heard.  Pulmonary:      Effort: Pulmonary effort is normal. No respiratory distress.      Breath sounds: Normal breath sounds. No wheezing.   Abdominal:      General: Bowel sounds are normal. " There is no distension.      Palpations: Abdomen is soft.      Tenderness: There is no abdominal tenderness.   Lymphadenopathy:      Cervical: No cervical adenopathy.   Neurological:      Mental Status: He is alert and oriented to person, place, and time.   Psychiatric:         Behavior: Behavior normal.         Thought Content: Thought content normal.         Judgment: Judgment normal.       Assessment:       ICD-10-CM ICD-9-CM   1. Anemia, unspecified type  D64.9 285.9   2. Folate deficiency  E53.8 266.2       Plan:     Patient with previous history of anemia with last hemoglobin of 13.1  Will repeat labs  Had folate deficiency.  Will check folate levels.    Medication List with Changes/Refills   Current Medications    ACETAMINOPHEN (TYLENOL ORAL)    Take 220 mg by mouth every 8 (eight) hours as needed. OTC    GUAIFENESIN (MUCINEX) 600 MG 12 HR TABLET    Take 1,200 mg by mouth 2 (two) times daily. OTC    LATANOPROST 0.005 % OPHTHALMIC SOLUTION    1 drop 2 (two) times a day. One drop in each eye twice daily    TIMOLOL MALEATE 0.5% (TIMOPTIC) 0.5 % DROP    1 drop 2 (two) times a day. One drop in each eye twice daily

## 2024-04-17 ENCOUNTER — TELEPHONE (OUTPATIENT)
Dept: PRIMARY CARE CLINIC | Facility: CLINIC | Age: 89
End: 2024-04-17
Payer: MEDICARE

## 2024-04-17 NOTE — TELEPHONE ENCOUNTER
----- Message from Haydee Buchanan sent at 4/17/2024 10:06 AM CDT -----  Contact: helen - son  Patient is requesting a call back regarding wanting to know if he need to be seen . Please call back at [phone]

## 2024-04-17 NOTE — TELEPHONE ENCOUNTER
Returned call to patient's son, Mr. Klein and advised per MD last lab results. Patient to stop folate, multivitamin.

## 2024-05-16 ENCOUNTER — OFFICE VISIT (OUTPATIENT)
Dept: PRIMARY CARE CLINIC | Facility: CLINIC | Age: 89
End: 2024-05-16
Payer: MEDICARE

## 2024-05-16 VITALS
HEIGHT: 60 IN | DIASTOLIC BLOOD PRESSURE: 58 MMHG | WEIGHT: 119.19 LBS | HEART RATE: 69 BPM | TEMPERATURE: 98 F | RESPIRATION RATE: 16 BRPM | BODY MASS INDEX: 23.4 KG/M2 | OXYGEN SATURATION: 99 % | SYSTOLIC BLOOD PRESSURE: 112 MMHG

## 2024-05-16 DIAGNOSIS — R41.3 MEMORY PROBLEM: ICD-10-CM

## 2024-05-16 DIAGNOSIS — Z00.00 ENCOUNTER FOR MEDICARE ANNUAL WELLNESS EXAM: Primary | ICD-10-CM

## 2024-05-16 PROCEDURE — 3288F FALL RISK ASSESSMENT DOCD: CPT | Mod: CPTII,S$GLB,, | Performed by: INTERNAL MEDICINE

## 2024-05-16 PROCEDURE — 1159F MED LIST DOCD IN RCRD: CPT | Mod: CPTII,S$GLB,, | Performed by: INTERNAL MEDICINE

## 2024-05-16 PROCEDURE — G0439 PPPS, SUBSEQ VISIT: HCPCS | Mod: S$GLB,,, | Performed by: INTERNAL MEDICINE

## 2024-05-16 PROCEDURE — 1101F PT FALLS ASSESS-DOCD LE1/YR: CPT | Mod: CPTII,S$GLB,, | Performed by: INTERNAL MEDICINE

## 2024-05-16 PROCEDURE — 99213 OFFICE O/P EST LOW 20 MIN: CPT | Mod: 25,S$GLB,, | Performed by: INTERNAL MEDICINE

## 2024-05-16 PROCEDURE — 1160F RVW MEDS BY RX/DR IN RCRD: CPT | Mod: CPTII,S$GLB,, | Performed by: INTERNAL MEDICINE

## 2024-05-16 NOTE — PROGRESS NOTES
Subjective:      Patient ID: Ernie Mcmullen is a 89 y.o. male.    Chief Complaint: Follow-up (Follow up )    HPI:  Patient with no known medical problem presented today for Medicare wellness exam.     Patient blood pressure is on lower side but denies any dizziness, lightheadedness.    Patient has received COVID vaccine   Patient pneumococcal 13 vaccine is up-to-date   Advised patient to get pneumococcal 20 vaccine   Advised patient to get Shingrix,  Tdap and RSV. Vaccine   Patient flu vaccine is up-to-date       HEALTH RISK ASSESSMENT:      Patient does not drive  for some time   Patient lives with wife and son and they are  great help if needed.   Patient is able to do ADLs without any assistance.   Patient denies use of tobacco or alcohol   Patient BMI of 29 is  in good range     Patient was able to recall  only 1/ 3 words after 5 minutes   Patient was able to draw clock and put the numbers but was not able to draw the arms to show the time   Total score 2/5    MOCA  was recommended to better understand patient memory   Son and patient insisted that memory is okay and no further testing is needed   Patient denied need of memory medication as well.     Patient denies depression.   Denies feeling of depression in last 2 weeks   Patient denies  lack of energy/interest in last 2 weeks   Patient reports hearing is okay.     Patient denies use of any control medication including benzodiazepine or narcotics     Patient is overall in good health considering his age   Patient reports he has a living well and advanced directives.      -  Review of Systems   Constitutional:  Negative for chills, diaphoresis, fever, malaise/fatigue and weight loss.   HENT:  Negative for congestion, ear pain, sinus pain, sore throat and tinnitus.    Eyes:  Negative for blurred vision and photophobia.   Respiratory:  Negative for cough, hemoptysis, shortness of breath and wheezing.    Cardiovascular:  Negative for chest pain, palpitations,  "orthopnea, leg swelling and PND.   Gastrointestinal:  Negative for abdominal pain, blood in stool, constipation, diarrhea, heartburn, melena, nausea and vomiting.   Genitourinary:  Negative for dysuria, frequency and urgency.   Musculoskeletal:  Negative for back pain, myalgias and neck pain.   Skin:  Negative for rash.   Neurological:  Negative for dizziness, tremors, seizures, loss of consciousness and weakness.   Endo/Heme/Allergies:  Negative for polydipsia.   Psychiatric/Behavioral:  Negative for depression and hallucinations. The patient does not have insomnia.      Objective:   BP (!) 112/58 (BP Location: Left arm, Patient Position: Sitting, BP Method: Medium (Automatic))   Pulse 69   Temp 98 °F (36.7 °C) (Oral)   Resp 16   Ht 4' 5" (1.346 m)   Wt 54.1 kg (119 lb 3.2 oz)   SpO2 99%   BMI 29.84 kg/m²     Physical Exam  Constitutional:       General: He is not in acute distress.     Appearance: He is not diaphoretic.   Neck:      Thyroid: No thyromegaly.   Cardiovascular:      Rate and Rhythm: Normal rate and regular rhythm.      Heart sounds: Normal heart sounds. No murmur heard.  Pulmonary:      Effort: Pulmonary effort is normal. No respiratory distress.      Breath sounds: Normal breath sounds. No wheezing.   Abdominal:      General: Bowel sounds are normal. There is no distension.      Palpations: Abdomen is soft.      Tenderness: There is no abdominal tenderness.   Lymphadenopathy:      Cervical: No cervical adenopathy.   Neurological:      Mental Status: He is alert and oriented to person, place, and time.   Psychiatric:         Behavior: Behavior normal.         Thought Content: Thought content normal.         Judgment: Judgment normal.     :   Assessment:       ICD-10-CM ICD-9-CM   1. Encounter for Medicare annual wellness exam  Z00.00 V70.0   2. Memory problem  R41.3 780.93       Plan:        Patient is overall in good health   Patient need vaccination   Patient memory does not seem to  be " doing very well.   Patient declined further testing or medication      Medication List with Changes/Refills   Current Medications    ACETAMINOPHEN (TYLENOL ORAL)    Take 220 mg by mouth every 8 (eight) hours as needed. OTC    LATANOPROST 0.005 % OPHTHALMIC SOLUTION    1 drop 2 (two) times a day. One drop in each eye twice daily    TIMOLOL MALEATE 0.5% (TIMOPTIC) 0.5 % DROP    1 drop 2 (two) times a day. One drop in each eye twice daily

## 2024-05-22 NOTE — PROGRESS NOTES
Patient does not have any medical problem.  I have adjusted Assessment and Plan please let me know if it will work

## 2024-06-07 ENCOUNTER — TELEPHONE (OUTPATIENT)
Dept: PRIMARY CARE CLINIC | Facility: CLINIC | Age: 89
End: 2024-06-07
Payer: MEDICARE

## 2024-06-07 DIAGNOSIS — M79.672 PAIN IN BOTH FEET: Primary | ICD-10-CM

## 2024-06-07 DIAGNOSIS — L60.2 THICKENED NAILS: ICD-10-CM

## 2024-06-07 DIAGNOSIS — M79.671 PAIN IN BOTH FEET: Primary | ICD-10-CM

## 2024-06-07 NOTE — TELEPHONE ENCOUNTER
S/w patient's son and he states pt needs referral to podiatrist. Was previously est w/ Dr. Solorzano.

## 2024-06-10 ENCOUNTER — TELEPHONE (OUTPATIENT)
Dept: PRIMARY CARE CLINIC | Facility: CLINIC | Age: 89
End: 2024-06-10
Payer: MEDICARE

## 2024-06-10 NOTE — TELEPHONE ENCOUNTER
Returned call to [facility]/ Home Health and advised patient will need appt for eval Home Health services.

## 2024-06-10 NOTE — TELEPHONE ENCOUNTER
----- Message from Leena Ennis sent at 6/10/2024  2:52 PM CDT -----  Contact: pt  Vanita kina/[facility] calling for referral for home and pt last office notes and she can be reached at [phone]    Thanks,

## 2024-06-26 ENCOUNTER — OFFICE VISIT (OUTPATIENT)
Dept: PRIMARY CARE CLINIC | Facility: CLINIC | Age: 89
End: 2024-06-26
Payer: MEDICARE

## 2024-06-26 VITALS
TEMPERATURE: 98 F | WEIGHT: 116.69 LBS | SYSTOLIC BLOOD PRESSURE: 120 MMHG | BODY MASS INDEX: 22.91 KG/M2 | HEART RATE: 65 BPM | RESPIRATION RATE: 16 BRPM | HEIGHT: 60 IN | OXYGEN SATURATION: 99 % | DIASTOLIC BLOOD PRESSURE: 63 MMHG

## 2024-06-26 DIAGNOSIS — R26.81 UNSTEADY GAIT: ICD-10-CM

## 2024-06-26 DIAGNOSIS — R41.3 MEMORY PROBLEM: Primary | ICD-10-CM

## 2024-06-26 PROCEDURE — 3288F FALL RISK ASSESSMENT DOCD: CPT | Mod: CPTII,S$GLB,, | Performed by: INTERNAL MEDICINE

## 2024-06-26 PROCEDURE — 1160F RVW MEDS BY RX/DR IN RCRD: CPT | Mod: CPTII,S$GLB,, | Performed by: INTERNAL MEDICINE

## 2024-06-26 PROCEDURE — 1159F MED LIST DOCD IN RCRD: CPT | Mod: CPTII,S$GLB,, | Performed by: INTERNAL MEDICINE

## 2024-06-26 PROCEDURE — 99214 OFFICE O/P EST MOD 30 MIN: CPT | Mod: S$GLB,,, | Performed by: INTERNAL MEDICINE

## 2024-06-26 PROCEDURE — 1101F PT FALLS ASSESS-DOCD LE1/YR: CPT | Mod: CPTII,S$GLB,, | Performed by: INTERNAL MEDICINE

## 2024-06-26 RX ORDER — MEMANTINE HYDROCHLORIDE 5 MG/1
5 TABLET ORAL 2 TIMES DAILY
Qty: 60 TABLET | Refills: 11 | Status: SHIPPED | OUTPATIENT
Start: 2024-06-26 | End: 2025-06-26

## 2024-06-26 NOTE — PROGRESS NOTES
"  Subjective:      Patient ID: Ernie Mcmullen is a 89 y.o. male.    Chief Complaint: Follow-up    : Patient son called reporting the ankles are swollen, no shortness of breath, No PND or Orthopnea. Patient denies any ankle swelling, mostly in the morning.     Patient son also reported unsteady gait and had a fall. Patient reports he was just walking and lost his balance, patient denies tripping on anything. Patient son also reports that patient is having difficulty taking shower.       Patient's son also reported patient running more forgetful.  Patient ask same questions again and again.  Patient repeats himself frequently.  Son denied patient having hallucination,  agitation or  depression.    Review of Systems   Constitutional:  Negative for chills, diaphoresis, fever, malaise/fatigue and weight loss.   HENT:  Negative for congestion, ear pain, sinus pain, sore throat and tinnitus.    Eyes:  Negative for blurred vision and photophobia.   Respiratory:  Negative for cough, hemoptysis, shortness of breath and wheezing.    Cardiovascular:  Negative for chest pain, palpitations, orthopnea, leg swelling and PND.   Gastrointestinal:  Negative for abdominal pain, blood in stool, constipation, diarrhea, heartburn, melena, nausea and vomiting.   Genitourinary:  Negative for dysuria, frequency and urgency.   Musculoskeletal:  Negative for back pain, myalgias and neck pain.   Skin:  Negative for rash.   Neurological:  Negative for dizziness, tremors, seizures, loss of consciousness and weakness.   Endo/Heme/Allergies:  Negative for polydipsia.   Psychiatric/Behavioral:  Negative for depression and hallucinations. The patient does not have insomnia.      Objective:   /63 (BP Location: Right arm, Patient Position: Sitting, BP Method: Small (Automatic))   Pulse 65   Temp 98 °F (36.7 °C) (Oral)   Resp 16   Ht 4' 5" (1.346 m)   Wt 52.9 kg (116 lb 11.2 oz)   SpO2 99%   BMI 29.21 kg/m²     Physical " Exam  Constitutional:       General: He is not in acute distress.     Appearance: He is not diaphoretic.   Neck:      Thyroid: No thyromegaly.   Cardiovascular:      Rate and Rhythm: Normal rate and regular rhythm.      Heart sounds: Normal heart sounds. No murmur heard.  Pulmonary:      Effort: Pulmonary effort is normal. No respiratory distress.      Breath sounds: Normal breath sounds. No wheezing.   Abdominal:      General: Bowel sounds are normal. There is no distension.      Palpations: Abdomen is soft.      Tenderness: There is no abdominal tenderness.   Lymphadenopathy:      Cervical: No cervical adenopathy.   Neurological:      Mental Status: He is alert.   Psychiatric:         Behavior: Behavior normal.         Thought Content: Thought content normal.         Judgment: Judgment normal.       Assessment:       ICD-10-CM ICD-9-CM   1. Memory problem  R41.3 780.93   2. Unsteady gait  R26.81 781.2       Plan:      Patient's son reported unsteady gait and recurrent fall   Will refer to home health for home physical therapy and gait stability/ training   Patient may need personal care assistant for ADLs as well   Patient has poor memory and scored 8/30 on MOCA.     Patient is not very educated and accuracy of [place] is questionable   As patient family reports  that patient is frequently repeating himself and asking same questions   Will use Namenda    Medication List with Changes/Refills   Current Medications    ACETAMINOPHEN (TYLENOL ORAL)    Take 220 mg by mouth every 8 (eight) hours as needed. OTC    LATANOPROST 0.005 % OPHTHALMIC SOLUTION    1 drop 2 (two) times a day. One drop in each eye twice daily    TIMOLOL MALEATE 0.5% (TIMOPTIC) 0.5 % DROP    1 drop 2 (two) times a day. One drop in each eye twice daily

## 2024-06-27 ENCOUNTER — TELEPHONE (OUTPATIENT)
Dept: PRIMARY CARE CLINIC | Facility: CLINIC | Age: 89
End: 2024-06-27
Payer: MEDICARE

## 2024-06-28 ENCOUNTER — TELEPHONE (OUTPATIENT)
Dept: PRIMARY CARE CLINIC | Facility: CLINIC | Age: 89
End: 2024-06-28
Payer: MEDICARE

## 2024-06-28 NOTE — TELEPHONE ENCOUNTER
S/w Vanita w/ [facility] and she states she is needed a different diagnosis code added to visit from 06/26/24, states she cannot use Memory problem R41.3 because it is a 'symptom code'. Please advise.

## 2024-06-28 NOTE — TELEPHONE ENCOUNTER
----- Message from Gracie Castro sent at 6/28/2024 12:48 PM CDT -----  Contact: Vanita Waldron with home health care [facility] is requesting a call back regarding the patient. Please call [phone]

## 2024-07-01 ENCOUNTER — TELEPHONE (OUTPATIENT)
Dept: PRIMARY CARE CLINIC | Facility: CLINIC | Age: 89
End: 2024-07-01
Payer: MEDICARE

## 2024-07-01 NOTE — TELEPHONE ENCOUNTER
----- Message from Jazmyn Sewell MA sent at 7/1/2024 11:54 AM CDT -----  Contact: Carrie/Home [facility]    ----- Message -----  From: Pat Walsh  Sent: 7/1/2024  10:09 AM CDT  To: Goran Elizalde Staff    Carrie with [facility] is calling to speak with someone regarding note. Reports receiving symptom code for patient and request additional diagnosis codes and last couple of office notes are faxed to [phone]. Please give [facility] a call back at [phone], if necessary.  Thank you,  GH

## 2024-07-01 NOTE — TELEPHONE ENCOUNTER
----- Message from Yohana Schafer sent at 7/1/2024  3:32 PM CDT -----  [facility] is calling again for correct ICD codes for referral please call her back at [phone]

## 2024-07-02 NOTE — TELEPHONE ENCOUNTER
----- Message from Yohana Schafer sent at 7/2/2024 10:40 AM CDT -----   [facility] calling will need documentation for diagnosis for Dementia on the doctors notes please fax [phone]...

## 2024-07-02 NOTE — TELEPHONE ENCOUNTER
----- Message from Brandon Parker sent at 7/2/2024  3:55 PM CDT -----  Contact: Joya( [facility])  Joya called in regards to she  would like for the nurse or  to give a call back concerning a note for patient. Call Back   [phone] ask for Joya or Alondra

## 2024-07-02 NOTE — TELEPHONE ENCOUNTER
Vanita w/ [facility] is requesting addendum to last ov note w/ dx of Alzheimer's Dementia G30.9 added to note. Please advise.

## 2024-07-02 NOTE — TELEPHONE ENCOUNTER
Returned call to Alondra / [facility], advised Dx code was verbally given this morning and request for addendum was given to Dr. Zimmer this morning as well, advised I will call back once MD is done with clinic.

## 2024-07-03 PROBLEM — F02.A0 MILD LATE ONSET ALZHEIMER'S DEMENTIA WITHOUT BEHAVIORAL DISTURBANCE, PSYCHOTIC DISTURBANCE, MOOD DISTURBANCE, OR ANXIETY: Status: ACTIVE | Noted: 2024-07-03

## 2024-07-03 PROBLEM — G30.1 MILD LATE ONSET ALZHEIMER'S DEMENTIA WITHOUT BEHAVIORAL DISTURBANCE, PSYCHOTIC DISTURBANCE, MOOD DISTURBANCE, OR ANXIETY: Status: ACTIVE | Noted: 2024-07-03

## 2024-08-02 ENCOUNTER — TELEPHONE (OUTPATIENT)
Dept: PRIMARY CARE CLINIC | Facility: CLINIC | Age: 89
End: 2024-08-02
Payer: MEDICARE

## 2024-08-05 ENCOUNTER — DOCUMENT SCAN (OUTPATIENT)
Dept: HOME HEALTH SERVICES | Facility: HOSPITAL | Age: 89
End: 2024-08-05
Payer: MEDICARE

## 2024-08-06 ENCOUNTER — TELEPHONE (OUTPATIENT)
Dept: PRIMARY CARE CLINIC | Facility: CLINIC | Age: 89
End: 2024-08-06
Payer: MEDICARE

## 2024-08-19 ENCOUNTER — TELEPHONE (OUTPATIENT)
Dept: PRIMARY CARE CLINIC | Facility: CLINIC | Age: 89
End: 2024-08-19
Payer: MEDICARE

## 2024-08-20 ENCOUNTER — TELEPHONE (OUTPATIENT)
Dept: PRIMARY CARE CLINIC | Facility: CLINIC | Age: 89
End: 2024-08-20
Payer: MEDICARE

## 2024-08-21 ENCOUNTER — OFFICE VISIT (OUTPATIENT)
Dept: PRIMARY CARE CLINIC | Facility: CLINIC | Age: 89
End: 2024-08-21
Payer: MEDICARE

## 2024-08-21 VITALS
SYSTOLIC BLOOD PRESSURE: 105 MMHG | DIASTOLIC BLOOD PRESSURE: 53 MMHG | RESPIRATION RATE: 16 BRPM | TEMPERATURE: 98 F | OXYGEN SATURATION: 98 % | BODY MASS INDEX: 23.56 KG/M2 | HEIGHT: 60 IN | HEART RATE: 60 BPM | WEIGHT: 120 LBS

## 2024-08-21 DIAGNOSIS — R53.83 FATIGUE, UNSPECIFIED TYPE: Primary | ICD-10-CM

## 2024-08-21 DIAGNOSIS — F02.A0 MILD LATE ONSET ALZHEIMER'S DEMENTIA WITHOUT BEHAVIORAL DISTURBANCE, PSYCHOTIC DISTURBANCE, MOOD DISTURBANCE, OR ANXIETY: ICD-10-CM

## 2024-08-21 DIAGNOSIS — R26.81 UNSTEADY GAIT: ICD-10-CM

## 2024-08-21 DIAGNOSIS — G30.1 MILD LATE ONSET ALZHEIMER'S DEMENTIA WITHOUT BEHAVIORAL DISTURBANCE, PSYCHOTIC DISTURBANCE, MOOD DISTURBANCE, OR ANXIETY: ICD-10-CM

## 2024-08-21 NOTE — PROGRESS NOTES
Subjective:      Patient ID: Ernie Mcmullen is a 89 y.o. male.    Chief Complaint: Medicare AWV (F/u discuss need for PT OT, unsteady gait) and Leg Swelling (C/o BLE swelling )    HPI:  On last visit was evaluated for being more forgetful and MOCA was administered patient score 8/30 on [place].  Patient was started on Namenda for possible Alzheimer's dementia but he has not yet started the medication.    Patient on last visit also complained of unsteady gait and falls.  Patient was referred to home health for physical therapy and occupational therapy.     Patient's son also reports that patient is becoming more fatigued and doses of multiple times during the daytime.  Patient also reports bilateral leg swelling, son denies any PND orthopnea or shortness on breath but reports occasional cough.  Cough is nonproductive, no wheezing or shortness on breath.     Review of Systems   Constitutional:  Negative for chills, diaphoresis, fever, malaise/fatigue and weight loss.   HENT:  Negative for congestion, ear pain, sinus pain, sore throat and tinnitus.    Eyes:  Negative for blurred vision and photophobia.   Respiratory:  Positive for cough. Negative for hemoptysis, shortness of breath and wheezing.    Cardiovascular:  Positive for leg swelling. Negative for chest pain, palpitations, orthopnea and PND.   Gastrointestinal:  Negative for abdominal pain, blood in stool, constipation, diarrhea, heartburn, melena, nausea and vomiting.   Genitourinary:  Negative for dysuria, frequency and urgency.   Musculoskeletal:  Negative for back pain, myalgias and neck pain.   Skin:  Negative for rash.   Neurological:  Negative for dizziness, tremors, seizures, loss of consciousness and weakness.   Endo/Heme/Allergies:  Negative for polydipsia.   Psychiatric/Behavioral:  Negative for depression and hallucinations. The patient does not have insomnia.      Objective:   BP (!) 105/53 (BP Location: Left arm, Patient Position: Sitting, BP  "Method: Medium (Automatic))   Pulse 60   Temp 97.6 °F (36.4 °C) (Oral)   Resp 16   Ht 4' 5" (1.346 m)   Wt 54.4 kg (120 lb)   SpO2 98%   BMI 30.04 kg/m²     Physical Exam  Constitutional:       General: He is not in acute distress.     Appearance: He is not diaphoretic.   Neck:      Thyroid: No thyromegaly.   Cardiovascular:      Rate and Rhythm: Normal rate and regular rhythm.      Heart sounds: Normal heart sounds. No murmur heard.  Pulmonary:      Effort: Pulmonary effort is normal. No respiratory distress.      Breath sounds: Normal breath sounds. No wheezing.   Abdominal:      General: Bowel sounds are normal. There is no distension.      Palpations: Abdomen is soft.      Tenderness: There is no abdominal tenderness.   Lymphadenopathy:      Cervical: No cervical adenopathy.   Neurological:      Mental Status: He is alert.      Comments: Bilateral leg strength is 5/5   Psychiatric:         Behavior: Behavior normal.         Thought Content: Thought content normal.         Judgment: Judgment normal.     :   Assessment:       ICD-10-CM ICD-9-CM   1. Fatigue, unspecified type  R53.83 780.79   2. Mild late onset Alzheimer's dementia without behavioral disturbance, psychotic disturbance, mood disturbance, or anxiety  G30.1 331.0    F02.A0 294.10   3. Unsteady gait  R26.81 781.2       Plan:     Patient with Alzheimer's dementia and is started on Namenda but has not started the medication  Advised family to start the medication again  Patient has unsteady gait and is evaluated by home health and they recommended physical therapy  Will continue physical therapy.   Patient needs to be educated about how to use cane and walker.   Patient is observed walking holding the cane and without using it.   Patient is having difficulty bathing, will request home health to send  to find if PCA can be arranged for patient  Patient son reports fatigue Will order CBC CMP and TSH  Patient previous labs suggest mild " anemia  Advised family patient need shingle and pneumonia vaccinel.    Medication List with Changes/Refills   Current Medications    ACETAMINOPHEN (TYLENOL ORAL)    Take 220 mg by mouth every 8 (eight) hours as needed. OTC    LATANOPROST 0.005 % OPHTHALMIC SOLUTION    1 drop 2 (two) times a day. One drop in each eye twice daily    MEMANTINE (NAMENDA) 5 MG TAB    Take 1 tablet (5 mg total) by mouth 2 (two) times daily.    TIMOLOL MALEATE 0.5% (TIMOPTIC) 0.5 % DROP    1 drop 2 (two) times a day. One drop in each eye twice daily

## 2024-08-22 ENCOUNTER — DOCUMENT SCAN (OUTPATIENT)
Dept: HOME HEALTH SERVICES | Facility: HOSPITAL | Age: 89
End: 2024-08-22
Payer: MEDICARE

## 2024-08-26 ENCOUNTER — EXTERNAL HOME HEALTH (OUTPATIENT)
Dept: HOME HEALTH SERVICES | Facility: HOSPITAL | Age: 89
End: 2024-08-26
Payer: MEDICARE

## 2024-09-05 ENCOUNTER — TELEPHONE (OUTPATIENT)
Dept: PRIMARY CARE CLINIC | Facility: CLINIC | Age: 89
End: 2024-09-05
Payer: MEDICARE

## 2024-09-26 ENCOUNTER — OFFICE VISIT (OUTPATIENT)
Dept: PRIMARY CARE CLINIC | Facility: CLINIC | Age: 89
End: 2024-09-26
Payer: MEDICARE

## 2024-09-26 VITALS
DIASTOLIC BLOOD PRESSURE: 66 MMHG | TEMPERATURE: 98 F | BODY MASS INDEX: 23.36 KG/M2 | SYSTOLIC BLOOD PRESSURE: 123 MMHG | HEART RATE: 61 BPM | WEIGHT: 119 LBS | RESPIRATION RATE: 16 BRPM | OXYGEN SATURATION: 99 % | HEIGHT: 60 IN

## 2024-09-26 DIAGNOSIS — Z71.89 COUNSELING REGARDING GOALS OF CARE: Primary | ICD-10-CM

## 2024-09-26 PROCEDURE — 3288F FALL RISK ASSESSMENT DOCD: CPT | Mod: CPTII,,, | Performed by: INTERNAL MEDICINE

## 2024-09-26 PROCEDURE — 1159F MED LIST DOCD IN RCRD: CPT | Mod: CPTII,,, | Performed by: INTERNAL MEDICINE

## 2024-09-26 PROCEDURE — 1160F RVW MEDS BY RX/DR IN RCRD: CPT | Mod: CPTII,,, | Performed by: INTERNAL MEDICINE

## 2024-09-26 PROCEDURE — 99213 OFFICE O/P EST LOW 20 MIN: CPT | Mod: S$PBB,,, | Performed by: INTERNAL MEDICINE

## 2024-09-26 PROCEDURE — 1101F PT FALLS ASSESS-DOCD LE1/YR: CPT | Mod: CPTII,,, | Performed by: INTERNAL MEDICINE

## 2024-09-26 NOTE — PROGRESS NOTES
"  Subjective:      Patient ID: Ernie Mcmullen is a 89 y.o. male.    Chief Complaint: Follow-up (3 month follow up )    HPI:  Patient is here today accompanied by his son to discuss home health.  Patient is not any prescribed medication + does not have any history of falls and is able to walk with his cane without any assistance.  Patient is able to do ADLs but need help with bathing + getting in and out of shower.      Discussed with patient and his son difference between personal care assistant and home health.  I do not think patient needs home health but he needs personal care assistance who can help patient with taking a bad + may also help patient with changing clothes + help with cooking.  Family is given number for " on aging" and advised to discuss personal care assistance + other helps that the patient can get.      Family questions are answered.    Total time spent counseling patient and family is about 20 minutes    ROS:  Not performed    Objective:   /66 (BP Location: Right arm, Patient Position: Sitting, BP Method: Medium (Automatic))   Pulse 61   Temp 98 °F (36.7 °C) (Oral)   Resp 16   Ht 4' 5" (1.346 m)   Wt 54 kg (119 lb)   SpO2 99%   BMI 29.79 kg/m²     Physical Exam  Assessment:     No diagnosis found.    Plan:     Medication List with Changes/Refills   Current Medications    ACETAMINOPHEN (TYLENOL ORAL)    Take 220 mg by mouth every 8 (eight) hours as needed. OTC    LATANOPROST 0.005 % OPHTHALMIC SOLUTION    1 drop 2 (two) times a day. One drop in each eye twice daily    MEMANTINE (NAMENDA) 5 MG TAB    Take 1 tablet (5 mg total) by mouth 2 (two) times daily.    TIMOLOL MALEATE 0.5% (TIMOPTIC) 0.5 % DROP    1 drop 2 (two) times a day. One drop in each eye twice daily        "

## 2024-09-27 ENCOUNTER — TELEPHONE (OUTPATIENT)
Dept: PRIMARY CARE CLINIC | Facility: CLINIC | Age: 89
End: 2024-09-27
Payer: MEDICARE

## 2024-09-27 NOTE — TELEPHONE ENCOUNTER
----- Message from Johnna Red sent at 9/27/2024  2:28 PM CDT -----  Regarding: Orders  Contact: B[facility]he[facility]  Per phone call with Batsheva, the patient is requesting orders for home health [facility] for nursing,home health aid, physical therapy and personal care services.  Please return call at [phone] and ask for Batsheva.    Thanks,  SJ

## 2024-10-02 ENCOUNTER — TELEPHONE (OUTPATIENT)
Dept: PRIMARY CARE CLINIC | Facility: CLINIC | Age: 89
End: 2024-10-02
Payer: MEDICARE

## 2024-10-02 NOTE — TELEPHONE ENCOUNTER
Returned call to patient's son, Mr. Klein and he is requesting status of referral to [facility] and states they are waiting for the referral and chart notes. Son states patient needs physical therapy and also help bathing. Advised that patient would need a PCA, son states [facility] offers those services but last order for PT ended too soon.

## 2024-10-03 NOTE — TELEPHONE ENCOUNTER
We have discussed in detail on last visit that patient needs personal care assistance and not home health.  If patient needs physical therapy please request home health 2002 send me documentation how physical therapy on last visit helped patient.   If there was improvement after physical therapy, I will consider sending a new referral.

## 2025-02-17 LAB
CHOLEST SERPL-MCNC: 82 MG/DL
CHOLEST SERPL-MSCNC: 155 MG/DL
HDLC SERPL-MCNC: 41 MG/DL
LDLC SERPL CALC-MCNC: 95 MG/DL

## 2025-03-03 ENCOUNTER — TELEPHONE (OUTPATIENT)
Dept: PRIMARY CARE CLINIC | Facility: CLINIC | Age: OVER 89
End: 2025-03-03
Payer: MEDICARE

## 2025-03-03 NOTE — TELEPHONE ENCOUNTER
----- Message from Brandy sent at 3/3/2025  2:31 PM CST -----   Patient son is calling to schedule hospital follow up for patient please call him back at [phone]

## 2025-03-05 ENCOUNTER — TELEPHONE (OUTPATIENT)
Dept: PRIMARY CARE CLINIC | Facility: CLINIC | Age: OVER 89
End: 2025-03-05
Payer: MEDICARE

## 2025-03-05 NOTE — TELEPHONE ENCOUNTER
----- Message from Yamel sent at 3/5/2025  8:29 AM CST -----  Contact: ROSELYN BIGGS [[id number]]  ..Type:  Sooner Apoointment RequestCaller is requesting a sooner appointment.  Caller declined first available appointment listed below.  Caller will not accept being placed on the waitlist and is requesting a message be sent to doctor.Name of Caller: ROSELYN BIGGS [[id number]]When is the first available appointment? 5/29Symptoms: hospital follow up. Blood clouts in legsWould the patient rather a call back or a response via [facility]?  [facility] Call Back Number: [phone] (home) Additional Information:

## 2025-03-05 NOTE — TELEPHONE ENCOUNTER
Pt son advised provider is no longer here. He states memorial is unprofessional they are not allowed to schedule yet and he needs to be seen.

## 2025-03-11 ENCOUNTER — OFFICE VISIT (OUTPATIENT)
Dept: PRIMARY CARE CLINIC | Facility: CLINIC | Age: OVER 89
End: 2025-03-11
Payer: MEDICARE

## 2025-03-11 VITALS
HEIGHT: 60 IN | BODY MASS INDEX: 23.36 KG/M2 | HEART RATE: 72 BPM | WEIGHT: 119 LBS | OXYGEN SATURATION: 98 % | SYSTOLIC BLOOD PRESSURE: 116 MMHG | DIASTOLIC BLOOD PRESSURE: 64 MMHG

## 2025-03-11 DIAGNOSIS — E03.9 HYPOTHYROIDISM, UNSPECIFIED TYPE: ICD-10-CM

## 2025-03-11 DIAGNOSIS — G30.1 MILD LATE ONSET ALZHEIMER'S DEMENTIA WITHOUT BEHAVIORAL DISTURBANCE, PSYCHOTIC DISTURBANCE, MOOD DISTURBANCE, OR ANXIETY: Primary | ICD-10-CM

## 2025-03-11 DIAGNOSIS — I82.4Z1 LOWER LEG DVT (DEEP VENOUS THROMBOEMBOLISM), ACUTE, RIGHT: ICD-10-CM

## 2025-03-11 DIAGNOSIS — F02.A0 MILD LATE ONSET ALZHEIMER'S DEMENTIA WITHOUT BEHAVIORAL DISTURBANCE, PSYCHOTIC DISTURBANCE, MOOD DISTURBANCE, OR ANXIETY: Primary | ICD-10-CM

## 2025-03-11 PROCEDURE — G2211 COMPLEX E/M VISIT ADD ON: HCPCS | Mod: S$PBB,,, | Performed by: INTERNAL MEDICINE

## 2025-03-11 PROCEDURE — 1101F PT FALLS ASSESS-DOCD LE1/YR: CPT | Mod: CPTII,,, | Performed by: INTERNAL MEDICINE

## 2025-03-11 PROCEDURE — 99214 OFFICE O/P EST MOD 30 MIN: CPT | Mod: S$PBB,,, | Performed by: INTERNAL MEDICINE

## 2025-03-11 PROCEDURE — 3288F FALL RISK ASSESSMENT DOCD: CPT | Mod: CPTII,,, | Performed by: INTERNAL MEDICINE

## 2025-03-11 PROCEDURE — 1159F MED LIST DOCD IN RCRD: CPT | Mod: CPTII,,, | Performed by: INTERNAL MEDICINE

## 2025-03-11 RX ORDER — ASPIRIN 81 MG/1
81 TABLET ORAL
COMMUNITY
Start: 2025-02-18

## 2025-03-11 RX ORDER — LEVOTHYROXINE SODIUM 25 UG/1
25 TABLET ORAL
COMMUNITY
Start: 2025-02-18 | End: 2025-03-11 | Stop reason: SDUPTHER

## 2025-03-11 RX ORDER — ROSUVASTATIN CALCIUM 5 MG/1
5 TABLET, COATED ORAL NIGHTLY
Qty: 90 TABLET | Refills: 3 | Status: SHIPPED | OUTPATIENT
Start: 2025-03-11

## 2025-03-11 RX ORDER — APIXABAN 5 MG/1
5 TABLET, FILM COATED ORAL
COMMUNITY
Start: 2025-02-18 | End: 2025-03-11 | Stop reason: SDUPTHER

## 2025-03-11 RX ORDER — ROSUVASTATIN CALCIUM 5 MG/1
5 TABLET, COATED ORAL NIGHTLY
COMMUNITY
Start: 2025-02-18 | End: 2025-03-11 | Stop reason: SDUPTHER

## 2025-03-11 RX ORDER — APIXABAN 5 MG/1
5 TABLET, FILM COATED ORAL 2 TIMES DAILY
Qty: 60 TABLET | Refills: 3 | Status: SHIPPED | OUTPATIENT
Start: 2025-03-11

## 2025-03-11 RX ORDER — LEVOTHYROXINE SODIUM 25 UG/1
25 TABLET ORAL
Qty: 90 TABLET | Refills: 3 | Status: SHIPPED | OUTPATIENT
Start: 2025-03-11

## 2025-03-11 NOTE — PROGRESS NOTES
Subjective:      Patient ID: Demetris Jurado is a 89 y.o. male.    Chief Complaint: Establish Care and Hospital Follow Up (Pt was taken to the ER at [facility] for chest pain. Paperwork brought in by daughter. )    HPI    Past Medical History:   Diagnosis Date    Acute DVT (deep venous thrombosis)     right lower leg    Anemia, unspecified     Arterial insufficiency        Patient with above medical problems here for hospital follow up  DVT noted in right leg, started on eliquis. He was referred to Hematology I suppose for coagulation workup. Reports some lower extremity edema which is improving       Review of Systems   Constitutional:  Negative for chills and fever.   HENT:  Positive for hearing loss.    Eyes:  Negative for blurred vision.   Respiratory:  Negative for cough, shortness of breath and wheezing.    Cardiovascular:  Positive for leg swelling. Negative for chest pain and palpitations.   Gastrointestinal:  Negative for abdominal pain, blood in stool, constipation, diarrhea, melena, nausea and vomiting.   Genitourinary:  Negative for dysuria, frequency and urgency.   Musculoskeletal:  Negative for falls.   Skin:  Negative for rash.   Neurological:  Negative for dizziness and headaches.   Endo/Heme/Allergies:  Does not bruise/bleed easily.   Psychiatric/Behavioral:  Negative for depression. The patient is not nervous/anxious.      Objective:     Physical Exam  Vitals reviewed.   Constitutional:       Appearance: Normal appearance.   HENT:      Head: Normocephalic.      Mouth/Throat:      Mouth: Mucous membranes are moist.      Pharynx: Oropharynx is clear.   Eyes:      Extraocular Movements: Extraocular movements intact.      Conjunctiva/sclera: Conjunctivae normal.      Pupils: Pupils are equal, round, and reactive to light.   Cardiovascular:      Rate and Rhythm: Normal rate and regular rhythm.   Pulmonary:      Effort: Pulmonary effort is normal.      Breath sounds: Normal breath sounds.  "  Abdominal:      General: Bowel sounds are normal.   Musculoskeletal:      Right lower leg: No edema.      Left lower leg: No edema.   Skin:     General: Skin is warm.      Capillary Refill: Capillary refill takes less than 2 seconds.   Neurological:      Mental Status: He is alert and oriented to person, place, and time.   Psychiatric:         Mood and Affect: Mood normal.       /64 (BP Location: Right arm, Patient Position: Sitting)   Pulse 72   Ht 4' 5" (1.346 m)   Wt 54 kg (119 lb)   SpO2 98%   BMI 29.79 kg/m²     Assessment:       ICD-10-CM ICD-9-CM   1. Mild late onset Alzheimer's dementia without behavioral disturbance, psychotic disturbance, mood disturbance, or anxiety  G30.1 331.0    F02.A0 294.10   2. Lower leg DVT (deep venous thromboembolism), acute, right  I82.4Z1 453.42   3. Hypothyroidism, unspecified type  E03.9 244.9       Plan:     Medication List with Changes/Refills   Current Medications    ASPIRIN (ECOTRIN) 81 MG EC TABLET    Take 81 mg by mouth.    LATANOPROST 0.005 % OPHTHALMIC SOLUTION    1 drop 2 (two) times a day. One drop in each eye twice daily    TIMOLOL MALEATE 0.5% (TIMOPTIC) 0.5 % DROP    1 drop 2 (two) times a day. One drop in each eye twice daily   Changed and/or Refilled Medications    Modified Medication Previous Medication    ELIQUIS 5 MG TAB ELIQUIS 5 mg Tab       Take 1 tablet (5 mg total) by mouth 2 (two) times daily.    Take 5 mg by mouth. TAKE 2 TABS PO BID DAILY FOR 7 DAYS THEN 1 TAB PO BID DAILY FOR 60 DAYS.    LEVOTHYROXINE (SYNTHROID) 25 MCG TABLET levothyroxine (SYNTHROID) 25 MCG tablet       Take 1 tablet (25 mcg total) by mouth before breakfast.    Take 25 mcg by mouth before breakfast.    ROSUVASTATIN (CRESTOR) 5 MG TABLET rosuvastatin (CRESTOR) 5 MG tablet       Take 1 tablet (5 mg total) by mouth every evening.    Take 5 mg by mouth every evening.   Discontinued Medications    ACETAMINOPHEN (TYLENOL ORAL)    Take 220 mg by mouth every 8 (eight) hours " as needed. OTC    MEMANTINE (NAMENDA) 5 MG TAB    Take 1 tablet (5 mg total) by mouth 2 (two) times daily.        1. Mild late onset Alzheimer's dementia without behavioral disturbance, psychotic disturbance, mood disturbance, or anxiety  -     Ambulatory referral/consult to Home Health; Future; Expected date: 03/14/2025    2. Lower leg DVT (deep venous thromboembolism), acute, right  -     rosuvastatin (CRESTOR) 5 MG tablet; Take 1 tablet (5 mg total) by mouth every evening.  Dispense: 90 tablet; Refill: 3  -     ELIQUIS 5 mg Tab; Take 1 tablet (5 mg total) by mouth 2 (two) times daily.  Dispense: 60 tablet; Refill: 3    3. Hypothyroidism, unspecified type  -     levothyroxine (SYNTHROID) 25 MCG tablet; Take 1 tablet (25 mcg total) by mouth before breakfast.  Dispense: 90 tablet; Refill: 3           DVT could have been provoked as patient may be sedentary due to age, recommend PT, will order HH with PT or if family available can take him to outpatient PT    Continue current meds          Future Appointments   Date Time Provider Department Center   9/11/2025 10:30 AM Krista Nelson MD [facility] Peralta

## 2025-03-17 DIAGNOSIS — I82.4Z1 LOWER LEG DVT (DEEP VENOUS THROMBOEMBOLISM), ACUTE, RIGHT: ICD-10-CM

## 2025-03-17 RX ORDER — ROSUVASTATIN CALCIUM 5 MG/1
5 TABLET, COATED ORAL NIGHTLY
Qty: 90 TABLET | Refills: 3 | Status: SHIPPED | OUTPATIENT
Start: 2025-03-17

## 2025-03-17 RX ORDER — APIXABAN 5 MG/1
5 TABLET, FILM COATED ORAL 2 TIMES DAILY
Qty: 60 TABLET | Refills: 3 | Status: SHIPPED | OUTPATIENT
Start: 2025-03-17

## 2025-03-17 NOTE — TELEPHONE ENCOUNTER
----- Message from Elena sent at 3/17/2025  9:27 AM CDT -----  Contact: martha  Type:  RX Refill RequestWho Called: martinez/lduaRefill or New Rx:refillRX Name and Strength:1)ELIQUIS 5 mg Tab2)rosuvastatin (CRESTOR) 5 MG tabletHow is the patient currently taking it? (ex. 1XDay):dailyIs this a 30 day or 90 day RX:90[phone] or [facility] Order:mail orderOrdering Provider:Micha the patient rather a call back or a response via [facility]? [facility] Call Back Number:[phone] Information: sent to [facility])

## 2025-03-18 ENCOUNTER — TELEPHONE (OUTPATIENT)
Dept: PRIMARY CARE CLINIC | Facility: CLINIC | Age: OVER 89
End: 2025-03-18
Payer: MEDICARE

## 2025-03-18 NOTE — TELEPHONE ENCOUNTER
"Anshuis is "too high so I want him to go through the same program my mother went through where they give it to him free. I do not want him to run out."     ----- Message from Elena sent at 3/18/2025 12:44 PM CDT -----  Contact: martinez  Type:  Patient Returning CallWho Called:Marissa Left Message for Patient:unsureDoes the patient know what this is regarding?:medication  questionsWould the patient rather a call back or a response via [facility]? Be Call Back Number:[phone] Information: n/a  "

## 2025-03-25 ENCOUNTER — PATIENT OUTREACH (OUTPATIENT)
Dept: ADMINISTRATIVE | Facility: HOSPITAL | Age: 89
End: 2025-03-25
Payer: MEDICARE

## 2025-08-06 ENCOUNTER — TELEPHONE (OUTPATIENT)
Dept: PRIMARY CARE CLINIC | Facility: CLINIC | Age: 89
End: 2025-08-06
Payer: MEDICARE

## 2025-08-06 NOTE — TELEPHONE ENCOUNTER
Copied from CRM #[id number]. Topic: General Inquiry - Patient Advice  >> Aug 6, 2025 12:40 PM Yamel wrote:  ..Type:  Patient Requesting Call    Who Called: Son, Demetris Jurado III  What is the call regarding?: he needs to speak with office regarding his father switched to Dr. Chaparro's new office.   Would the patient rather a call back or a response via [facility]?  call  Best Call Back Number: [phone]  Additional Information: